# Patient Record
Sex: MALE | Race: WHITE | Employment: FULL TIME | URBAN - METROPOLITAN AREA
[De-identification: names, ages, dates, MRNs, and addresses within clinical notes are randomized per-mention and may not be internally consistent; named-entity substitution may affect disease eponyms.]

---

## 2017-02-20 ENCOUNTER — OFFICE VISIT (OUTPATIENT)
Dept: INTERNAL MEDICINE CLINIC | Age: 62
End: 2017-02-20

## 2017-02-20 VITALS
OXYGEN SATURATION: 96 % | RESPIRATION RATE: 16 BRPM | BODY MASS INDEX: 37.11 KG/M2 | SYSTOLIC BLOOD PRESSURE: 150 MMHG | WEIGHT: 280 LBS | TEMPERATURE: 97.8 F | HEART RATE: 97 BPM | HEIGHT: 73 IN | DIASTOLIC BLOOD PRESSURE: 88 MMHG

## 2017-02-20 DIAGNOSIS — Z23 NEED FOR TDAP VACCINATION: ICD-10-CM

## 2017-02-20 DIAGNOSIS — Z11.59 NEED FOR HEPATITIS C SCREENING TEST: ICD-10-CM

## 2017-02-20 DIAGNOSIS — Z00.00 ROUTINE GENERAL MEDICAL EXAMINATION AT A HEALTH CARE FACILITY: Primary | ICD-10-CM

## 2017-02-20 DIAGNOSIS — Z12.11 COLON CANCER SCREENING: ICD-10-CM

## 2017-02-20 DIAGNOSIS — L98.9 SKIN LESION OF LEFT ARM: ICD-10-CM

## 2017-02-20 DIAGNOSIS — Z23 NEED FOR SHINGLES VACCINE: ICD-10-CM

## 2017-02-20 LAB
HEMOCCULT STL QL: POSITIVE
VALID INTERNAL CONTROL?: YES

## 2017-02-20 NOTE — ACP (ADVANCE CARE PLANNING)
End of life planning discussed with patient. Patient states that they do have an advance directive and will provide a copy for our office at next visit. Patient has a document and will provide to us. It is up to date.

## 2017-02-20 NOTE — MR AVS SNAPSHOT
Visit Information Date & Time Provider Department Dept. Phone Encounter #  
 2/20/2017  4:00 PM Kacy Douglas MD [address] [address] Internal Medicine [phone] [id number] Upcoming Health Maintenance Date Due Hepatitis C Screening 1955 COLONOSCOPY 9/18/1972 DTaP/Tdap/Td series (1 - Tdap) 9/18/1975 ZOSTER VACCINE AGE 60> 9/18/2014 Allergies as of 2/20/2017  Review Complete On: 2/20/2017 By: Yen Calvillo LPN No Known Allergies Current Immunizations  Never Reviewed Name Date Tdap  Incomplete Not reviewed this visit You Were Diagnosed With   
  
 Codes Comments Routine general medical examination at a health care facility    -  Primary ICD-10-CM: Z00.00 ICD-9-CM: V70.0 Need for hepatitis C screening test     ICD-10-CM: Z11.59 
ICD-9-CM: V73.89 Need for shingles vaccine     ICD-10-CM: Y42 ICD-9-CM: V04.89 Need for Tdap vaccination     ICD-10-CM: Q46 ICD-9-CM: V06.1 Colon cancer screening     ICD-10-CM: Z12.11 ICD-9-CM: V76.51 Skin lesion of left arm     ICD-10-CM: L98.9 ICD-9-CM: 709.9 Vitals BP Pulse Temp Resp Height(growth percentile) Weight(growth percentile) 150/88 97 97.8 °F (36.6 °C) (Oral) 16 6' 0.5\" (1.842 m) 280 lb (127 kg) SpO2 BMI Smoking Status 96% 37.45 kg/m2 Current Some Day Smoker Vitals History BMI and BSA Data Body Mass Index Body Surface Area  
 37.45 kg/m 2 2.55 m 2 Preferred Pharmacy Pharmacy Name Phone [id number] Lin Bowser, [address] [place] [address] [phone] Your Updated Medication List  
  
   
This list is accurate as of: 2/20/17  4:58 PM.  Always use your most recent med list.  
  
  
  
  
 varicella zoster vacine live 19,400 unit/0.65 mL Susr injection Commonly known as:  ZOSTAVAX  
1 Vial by SubCUTAneous route once for 1 dose.  Indications: PREVENTION OF HERPES ZOSTER  
  
  
  
  
 Prescriptions Printed Refills  
 varicella zoster vacine live (ZOSTAVAX) 19,400 unit/0.65 mL susr injection 0 Si Vial by SubCUTAneous route once for 1 dose. Indications: PREVENTION OF HERPES ZOSTER Class: Print Route: SubCUTAneous We Performed the Following CBC WITH AUTOMATED DIFF [28166 CPT(R)] HEPATITIS C AB [79565 CPT(R)] LIPID PANEL [88530 CPT(R)] METABOLIC PANEL, COMPREHENSIVE [12592 CPT(R)] WA IMMUNIZ ADMIN,1 SINGLE/COMB VAC/TOXOID [id number] CPT(R)] PROSTATE SPECIFIC AG (PSA) [id number] CPT(R)] REFERRAL TO DERMATOLOGY [REF19 Custom] Comments:  
 Please evaluate patient for left arm lesion. REFERRAL TO GASTROENTEROLOGY [[id number] Custom] TETANUS, DIPHTHERIA TOXOIDS AND ACELLULAR PERTUSSIS VACCINE (TDAP), IN INDIVIDS. >=7, IM [id number] CPT(R)] TSH RFX ON ABNORMAL TO FREE T4 [[id number] Custom] UA/M W/RFLX CULTURE, ROUTINE [[id number] Custom] Referral Information Referral ID Referred By Referred To  
  
 [id number] [address], [address] Visits Status Start Date End Date 1 New Request 17 2 If your referral has a status of pending review or denied, additional information will be sent to support the outcome of this decision. Referral ID Referred By Referred To  
 [id number] [address], [place]  
   [address] [facility] [place], [address] Phone: [phone] Fax: [phone] Visits Status Start Date End Date 1 New Request 17 2 If your referral has a status of pending review or denied, additional information will be sent to support the outcome of this decision. Patient Instructions Shoulder Stretches: Exercises Your Care Instructions Here are some examples of exercises for your shoulder.  Start each exercise slowly. Ease off the exercise if you start to have pain. Your doctor or physical therapist will tell you when you can start these exercises and which ones will work best for you. How to do the exercises Note: These exercises should cause you to feel a gentle stretch, but no pain. Shoulder stretch 1.  a doorway and place one arm against the door frame. Your elbow should be a little higher than your shoulder. 2. Relax your shoulders as you lean forward, allowing your chest and shoulder muscles to stretch. You can also turn your body slightly away from your arm to stretch the muscles even more. 3. Hold for 15 to 30 seconds. 4. Repeat 2 to 4 times with each arm. Shoulder and chest stretch Shoulder and chest stretch 1. While sitting, relax your upper body so you slump slightly in your chair. 2. As you breathe in, straighten your back and open your arms out to the sides. 3. Gently pull your shoulder blades back and downward. 4. Hold for 15 to 30 seconds as your breathe normally. 5. Repeat 2 to 4 times. Overhead stretch 1. Reach up over your head with both arms. 2. Hold for 15 to 30 seconds. 3. Repeat 2 to 4 times. Follow-up care is a key part of your treatment and safety. Be sure to make and go to all appointments, and call your doctor if you are having problems. It's also a good idea to know your test results and keep a list of the medicines you take. Where can you learn more? Go to http://goran-verna.info/. Enter S254 in the search box to learn more about \"Shoulder Stretches: Exercises. \" Current as of: May 23, 2016 Content Version: 11.1 © 8287-4281 Healthwise, Incorporated. Care instructions adapted under license by [employer] (which disclaims liability or warranty for this information).  If you have questions about a medical condition or this instruction, always ask your healthcare professional. Fannie Aldana, [employer]orporated disclaims any warranty or liability for your use of this information. Introducing [facility] & HEALTH SERVICES! Fatoumata White introduces Litographs patient portal. Now you can access parts of your medical record, email your doctor's office, and request medication refills online. 1. In your internet browser, go to [email or web address] 2. Click on the First Time User? Click Here link in the Sign In box. You will see the New Member Sign Up page. 3. Enter your Litographs Access Code exactly as it appears below. You will not need to use this code after youve completed the sign-up process. If you do not sign up before the expiration date, you must request a new code. · Litographs Access Code: [email or web address] Expires: 5/21/2017  4:58 PM 
 
4. Enter the last four digits of your Social Security Number (xxxx) and Date of Birth (mm/dd/yyyy) as indicated and click Submit. You will be taken to the next sign-up page. 5. Create a Litographs ID. This will be your Litographs login ID and cannot be changed, so think of one that is secure and easy to remember. 6. Create a Litographs password. You can change your password at any time. 7. Enter your Password Reset Question and Answer. This can be used at a later time if you forget your password. 8. Enter your e-mail address. You will receive e-mail notification when new information is available in [address] 9. Click Sign Up. You can now view and download portions of your medical record. 10. Click the Download Summary menu link to download a portable copy of your medical information. If you have questions, please visit the Frequently Asked Questions section of the Litographs website. Remember, Litographs is NOT to be used for urgent needs. For medical emergencies, dial 911. Now available from your iPhone and Android! Please provide this summary of care documentation to your next provider. Your primary care clinician is listed as [place] [address]64 If you have any questions after today's visit, please call [phone].

## 2017-02-20 NOTE — PATIENT INSTRUCTIONS
Shoulder Stretches: Exercises  Your Care Instructions  Here are some examples of exercises for your shoulder. Start each exercise slowly. Ease off the exercise if you start to have pain. Your doctor or physical therapist will tell you when you can start these exercises and which ones will work best for you. How to do the exercises  Note: These exercises should cause you to feel a gentle stretch, but no pain. Shoulder stretch    1.  a doorway and place one arm against the door frame. Your elbow should be a little higher than your shoulder. 2. Relax your shoulders as you lean forward, allowing your chest and shoulder muscles to stretch. You can also turn your body slightly away from your arm to stretch the muscles even more. 3. Hold for 15 to 30 seconds. 4. Repeat 2 to 4 times with each arm. Shoulder and chest stretch    Shoulder and chest stretch  1. While sitting, relax your upper body so you slump slightly in your chair. 2. As you breathe in, straighten your back and open your arms out to the sides. 3. Gently pull your shoulder blades back and downward. 4. Hold for 15 to 30 seconds as your breathe normally. 5. Repeat 2 to 4 times. Overhead stretch    1. Reach up over your head with both arms. 2. Hold for 15 to 30 seconds. 3. Repeat 2 to 4 times. Follow-up care is a key part of your treatment and safety. Be sure to make and go to all appointments, and call your doctor if you are having problems. It's also a good idea to know your test results and keep a list of the medicines you take. Where can you learn more? Go to http://goran-verna.info/. Enter S254 in the search box to learn more about \"Shoulder Stretches: Exercises. \"  Current as of: May 23, 2016  Content Version: 11.1  © 1059-3126 Youca.st, [employer]ell[employer]. Care instructions adapted under license by [employer] (which disclaims liability or warranty for this information).  If you have questions about a medical condition or this instruction, always ask your healthcare professional. [place] [address] any warranty or liability for your use of this information.

## 2017-02-21 NOTE — PROGRESS NOTES
Subjective: Kavitha Lester is a 57 y.o. male presenting for annual exam and complete physical.    There are no active problems to display for this patient. Current Outpatient Prescriptions   Medication Sig Dispense Refill    varicella zoster chance live (ZOSTAVAX) 19,400 unit/0.65 mL susr injection 1 Vial by SubCUTAneous route once for 1 dose. Indications: PREVENTION OF HERPES ZOSTER 0.65 mL 0     No Known Allergies  History reviewed. No pertinent past medical history. Past Surgical History   Procedure Laterality Date    Hx tonsillectomy       Family History   Problem Relation Age of Onset    Diabetes Mother     Prostate Cancer Father 67    Thyroid Disease Daughter      Social History   Substance Use Topics    Smoking status: Current Some Day Smoker     Types: Cigars    Smokeless tobacco: Never Used      Comment: cigar    Alcohol use 0.6 oz/week     1 Shots of liquor per week             Review of Systems  A comprehensive review of systems was negative except for: Constitutional: positive for weight up and not doing any exercise. Genitourinary: positive for some nocturia off and on.    Integument/breast: positive for skin lesion(s)    Objective:     Visit Vitals    /88    Pulse 97    Temp 97.8 °F (36.6 °C) (Oral)    Resp 16    Ht 6' 0.5\" (1.842 m)    Wt 280 lb (127 kg)    SpO2 96%    BMI 37.45 kg/m2     Physical exam:   General appearance - alert, well appearing, and in no distress  Eyes - pupils equal and reactive, extraocular eye movements intact  Ears - bilateral TM's and external ear canals normal  Nose - normal and patent, no erythema, discharge or polyps  Mouth - mucous membranes moist, pharynx normal without lesions  Neck - supple, no significant adenopathy  Lymphatics - no palpable lymphadenopathy, no hepatosplenomegaly  Chest - clear to auscultation, no wheezes, rales or rhonchi, symmetric air entry  Heart - normal rate, regular rhythm, normal S1, S2, no murmurs, rubs, clicks or gallops  Abdomen - soft, nontender, nondistended, no masses or organomegaly  Rectal - negative without mass, lesions or tenderness, stool guaiac positive, PROSTATE EXAM: smooth and symmetric without nodules or tenderness  Back exam - full range of motion, no tenderness, palpable spasm or pain on motion  Neurological - alert, oriented, normal speech, no focal findings or movement disorder noted  Musculoskeletal - no joint tenderness, deformity or swelling  Extremities - peripheral pulses normal, no pedal edema, no clubbing or cyanosis  Skin - dry scaly red lesion on the left forearm. Multiple seb K's on the back. Assessment/Plan:       lose weight, increase physical activity, bring BP log to office visit, follow low fat diet, follow low salt diet, routine labs ordered. Valdez Lomeli was seen today for establish care. Diagnoses and all orders for this visit:    Routine general medical examination at a health care facility  -     CBC WITH AUTOMATED DIFF  -     METABOLIC PANEL, COMPREHENSIVE  -     PROSTATE SPECIFIC AG  -     LIPID PANEL  -     HEPATITIS C AB  -     TSH RFX ON ABNORMAL TO FREE T4  -     UA/M W/RFLX CULTURE, ROUTINE  -     REFERRAL TO GASTROENTEROLOGY  -     AMB POC FECAL BLOOD, OCCULT, QL 1 CARD    Need for hepatitis C screening test    Need for shingles vaccine  -     varicella zoster vacine live (ZOSTAVAX) 19,400 unit/0.65 mL susr injection; 1 Vial by SubCUTAneous route once for 1 dose. Indications: PREVENTION OF HERPES ZOSTER    Need for Tdap vaccination  -     TETANUS, DIPHTHERIA TOXOIDS AND ACELLULAR PERTUSSIS VACCINE (TDAP), IN INDIVIDS. >=7, IM  -     DE IMMUNIZ ADMIN,1 SINGLE/COMB VAC/TOXOID    Colon cancer screening    Skin lesion of left arm - likely actinic keratosis. -     REFERRAL TO DERMATOLOGY  Borderlines BP - will monitor at home and let me know if not well controlled to 140/90 or less. Follow-up Disposition: 12 months and as needed.     Advised him to call back or return to office if symptoms worsen/change/persist.  Discussed expected course/resolution/complications of diagnosis in detail with patient. Medication risks/benefits/costs/interactions/alternatives discussed with patient. He was given an after visit summary which includes diagnoses, current medications, & vitals. He expressed understanding with the diagnosis and plan. Tung Mooney

## 2017-03-04 LAB
ALBUMIN SERPL-MCNC: 3.9 G/DL (ref 3.6–4.8)
ALBUMIN/GLOB SERPL: 1.4 {RATIO} (ref 1.1–2.5)
ALP SERPL-CCNC: 68 IU/L (ref 39–117)
ALT SERPL-CCNC: 16 IU/L (ref 0–44)
APPEARANCE UR: CLEAR
AST SERPL-CCNC: 17 IU/L (ref 0–40)
BACTERIA #/AREA URNS HPF: NORMAL /[HPF]
BASOPHILS # BLD AUTO: 0 X10E3/UL (ref 0–0.2)
BASOPHILS NFR BLD AUTO: 0 %
BILIRUB SERPL-MCNC: 0.6 MG/DL (ref 0–1.2)
BILIRUB UR QL STRIP: NEGATIVE
BUN SERPL-MCNC: 13 MG/DL (ref 8–27)
BUN/CREAT SERPL: 15 (ref 10–22)
CALCIUM SERPL-MCNC: 8.7 MG/DL (ref 8.6–10.2)
CASTS URNS QL MICRO: NORMAL /LPF
CHLORIDE SERPL-SCNC: 102 MMOL/L (ref 96–106)
CHOLEST SERPL-MCNC: 184 MG/DL (ref 100–199)
CO2 SERPL-SCNC: 25 MMOL/L (ref 18–29)
COLOR UR: YELLOW
CREAT SERPL-MCNC: 0.84 MG/DL (ref 0.76–1.27)
EOSINOPHIL # BLD AUTO: 0.1 X10E3/UL (ref 0–0.4)
EOSINOPHIL NFR BLD AUTO: 1 %
EPI CELLS #/AREA URNS HPF: NORMAL /HPF
ERYTHROCYTE [DISTWIDTH] IN BLOOD BY AUTOMATED COUNT: 13.6 % (ref 12.3–15.4)
GLOBULIN SER CALC-MCNC: 2.8 G/DL (ref 1.5–4.5)
GLUCOSE SERPL-MCNC: 82 MG/DL (ref 65–99)
GLUCOSE UR QL: NEGATIVE
HCT VFR BLD AUTO: 45.3 % (ref 37.5–51)
HCV AB S/CO SERPL IA: <0.1 S/CO RATIO (ref 0–0.9)
HDLC SERPL-MCNC: 29 MG/DL
HGB BLD-MCNC: 14.9 G/DL (ref 12.6–17.7)
HGB UR QL STRIP: NEGATIVE
IMM GRANULOCYTES # BLD: 0 X10E3/UL (ref 0–0.1)
IMM GRANULOCYTES NFR BLD: 0 %
KETONES UR QL STRIP: NEGATIVE
LDLC SERPL CALC-MCNC: 133 MG/DL (ref 0–99)
LEUKOCYTE ESTERASE UR QL STRIP: NEGATIVE
LYMPHOCYTES # BLD AUTO: 2 X10E3/UL (ref 0.7–3.1)
LYMPHOCYTES NFR BLD AUTO: 28 %
MCH RBC QN AUTO: 30.6 PG (ref 26.6–33)
MCHC RBC AUTO-ENTMCNC: 32.9 G/DL (ref 31.5–35.7)
MCV RBC AUTO: 93 FL (ref 79–97)
MICRO URNS: ABNORMAL
MICRO URNS: ABNORMAL
MONOCYTES # BLD AUTO: 0.6 X10E3/UL (ref 0.1–0.9)
MONOCYTES NFR BLD AUTO: 8 %
MUCOUS THREADS URNS QL MICRO: PRESENT
NEUTROPHILS # BLD AUTO: 4.4 X10E3/UL (ref 1.4–7)
NEUTROPHILS NFR BLD AUTO: 63 %
NITRITE UR QL STRIP: NEGATIVE
PH UR STRIP: 6 [PH] (ref 5–7.5)
PLATELET # BLD AUTO: 196 X10E3/UL (ref 150–379)
POTASSIUM SERPL-SCNC: 4.6 MMOL/L (ref 3.5–5.2)
PROT SERPL-MCNC: 6.7 G/DL (ref 6–8.5)
PROT UR QL STRIP: NEGATIVE
PSA SERPL-MCNC: 0.4 NG/ML (ref 0–4)
RBC # BLD AUTO: 4.87 X10E6/UL (ref 4.14–5.8)
RBC #/AREA URNS HPF: NORMAL /HPF
SODIUM SERPL-SCNC: 141 MMOL/L (ref 134–144)
SP GR UR: >=1.03 (ref 1–1.03)
TRIGL SERPL-MCNC: 112 MG/DL (ref 0–149)
TSH SERPL DL<=0.005 MIU/L-ACNC: 1.1 UIU/ML (ref 0.45–4.5)
URINALYSIS REFLEX, 377202: ABNORMAL
UROBILINOGEN UR STRIP-MCNC: 0.2 MG/DL (ref 0.2–1)
VLDLC SERPL CALC-MCNC: 22 MG/DL (ref 5–40)
WBC # BLD AUTO: 7 X10E3/UL (ref 3.4–10.8)
WBC #/AREA URNS HPF: NORMAL /HPF

## 2018-05-16 ENCOUNTER — OFFICE VISIT (OUTPATIENT)
Dept: INTERNAL MEDICINE CLINIC | Age: 63
End: 2018-05-16

## 2018-05-16 VITALS
WEIGHT: 216 LBS | OXYGEN SATURATION: 98 % | TEMPERATURE: 98.2 F | SYSTOLIC BLOOD PRESSURE: 118 MMHG | RESPIRATION RATE: 12 BRPM | HEART RATE: 74 BPM | HEIGHT: 72 IN | DIASTOLIC BLOOD PRESSURE: 78 MMHG | BODY MASS INDEX: 29.26 KG/M2

## 2018-05-16 DIAGNOSIS — Z00.00 ROUTINE GENERAL MEDICAL EXAMINATION AT A HEALTH CARE FACILITY: Primary | ICD-10-CM

## 2018-05-16 DIAGNOSIS — Z23 NEED FOR SHINGLES VACCINE: ICD-10-CM

## 2018-05-16 NOTE — MR AVS SNAPSHOT
[address] [address] 
[phone] Patient: Shadi Gutierrez MRN:  SO Visit Information Date & Time Provider Department Dept. Phone Encounter #  
 2018  4:00 PM Flora Vu MD [facility] Internal Medicine [phone] [id number] Upcoming Health Maintenance Date Due ZOSTER VACCINE AGE 60> 2014 Pneumococcal 19-64 Medium Risk (1 of 1 - PPSV23) 2020* Influenza Age 5 to Adult 2018 COLONOSCOPY 2020 DTaP/Tdap/Td series (2 - Td) 2027 *Topic was postponed. The date shown is not the original due date. Allergies as of 2018  Review Complete On: 2018 By: Flora Vu MD  
 No Known Allergies Current Immunizations  Reviewed on 2018 Name Date Tdap 2017 Reviewed by Flora Vu MD on 2018 at  4:18 PM  
You Were Diagnosed With   
  
 Codes Comments Routine general medical examination at a health care facility    -  Primary ICD-10-CM: Z00.00 ICD-9-CM: V70.0 Need for shingles vaccine     ICD-10-CM: J12 ICD-9-CM: V04.89 Vitals BP Pulse Temp Resp Height(growth percentile) Weight(growth percentile) 118/78 74 98.2 °F (36.8 °C) (Oral) 12 6' (1.829 m) 216 lb (98 kg) SpO2 BMI Smoking Status 98% 29.29 kg/m2 Current Some Day Smoker Vitals History BMI and BSA Data Body Mass Index Body Surface Area  
 29.29 kg/m 2 2.23 m 2 Preferred Pharmacy Pharmacy Name Phone [id number] Matthias Fong, [address] [facility] [phone] Your Updated Medication List  
  
   
This list is accurate as of 18  4:33 PM.  Always use your most recent med list.  
  
  
  
  
 varicella-zoster recombinant (PF) 50 mcg/0.5 mL Susr injection Commonly known as:  SHINGRIX (PF)  
 0.5 mL by IntraMUSCular route once for 1 dose. Prescriptions Printed Refills  
 varicella-zoster recombinant, PF, (SHINGRIX, PF,) 50 mcg/0.5 mL susr injection 1 Si.5 mL by IntraMUSCular route once for 1 dose. Class: Print Route: IntraMUSCular We Performed the Following CBC WITH AUTOMATED DIFF [26967 CPT(R)] LIPID PANEL [94031 CPT(R)] METABOLIC PANEL, COMPREHENSIVE [43814 CPT(R)] PSA, DIAGNOSTIC (PROSTATE SPECIFIC AG) [id number] CPT(R)] TSH RFX ON ABNORMAL TO FREE T4 [[id number] Custom] UA/M W/RFLX CULTURE, ROUTINE [[id number] Custom] Introducing [facility] & HEALTH SERVICES! [facility] introduces Cheyipai patient portal. Now you can access parts of your medical record, email your doctor's office, and request medication refills online. 1. In your internet browser, go to [email or web address] 2. Click on the First Time User? Click Here link in the Sign In box. You will see the New Member Sign Up page. 3. Enter your Cheyipai Access Code exactly as it appears below. You will not need to use this code after youve completed the sign-up process. If you do not sign up before the expiration date, you must request a new code. · Cheyipai Access Code: [email or web address] Expires: 2018  3:55 PM 
 
4. Enter the last four digits of your Social Security Number (xxxx) and Date of Birth (mm/dd/yyyy) as indicated and click Submit. You will be taken to the next sign-up page. 5. Create a Cheyipai ID. This will be your Cheyipai login ID and cannot be changed, so think of one that is secure and easy to remember. 6. Create a Cheyipai password. You can change your password at any time. 7. Enter your Password Reset Question and Answer. This can be used at a later time if you forget your password. 8. Enter your e-mail address. You will receive e-mail notification when new information is available in [address] 9. Click Sign Up. You can now view and download portions of your medical record. 10. Click the Download Summary menu link to download a portable copy of your medical information. If you have questions, please visit the Frequently Asked Questions section of the Learnmetrics website. Remember, Learnmetrics is NOT to be used for urgent needs. For medical emergencies, dial 911. Now available from your iPhone and Android! Please provide this summary of care documentation to your next provider. Your primary care clinician is listed as Daniel Oswald64 If you have any questions after today's visit, please call [phone].

## 2018-05-16 NOTE — PROGRESS NOTES
Chief Complaint   Patient presents with    Complete Physical     Health Maintenance Due   Topic    ZOSTER VACCINE AGE 60>      · Shingrix order placed.

## 2018-05-17 LAB
ALBUMIN SERPL-MCNC: 4.1 G/DL (ref 3.6–4.8)
ALBUMIN/GLOB SERPL: 1.6 {RATIO} (ref 1.2–2.2)
ALP SERPL-CCNC: 63 IU/L (ref 39–117)
ALT SERPL-CCNC: 14 IU/L (ref 0–44)
APPEARANCE UR: CLEAR
AST SERPL-CCNC: 16 IU/L (ref 0–40)
BACTERIA #/AREA URNS HPF: NORMAL /[HPF]
BASOPHILS # BLD AUTO: 0 X10E3/UL (ref 0–0.2)
BASOPHILS NFR BLD AUTO: 0 %
BILIRUB SERPL-MCNC: 0.7 MG/DL (ref 0–1.2)
BILIRUB UR QL STRIP: NEGATIVE
BUN SERPL-MCNC: 21 MG/DL (ref 8–27)
BUN/CREAT SERPL: 26 (ref 10–24)
CALCIUM SERPL-MCNC: 8.9 MG/DL (ref 8.6–10.2)
CASTS URNS QL MICRO: NORMAL /LPF
CHLORIDE SERPL-SCNC: 100 MMOL/L (ref 96–106)
CHOLEST SERPL-MCNC: 180 MG/DL (ref 100–199)
CO2 SERPL-SCNC: 26 MMOL/L (ref 18–29)
COLOR UR: YELLOW
CREAT SERPL-MCNC: 0.8 MG/DL (ref 0.76–1.27)
EOSINOPHIL # BLD AUTO: 0.1 X10E3/UL (ref 0–0.4)
EOSINOPHIL NFR BLD AUTO: 2 %
EPI CELLS #/AREA URNS HPF: NORMAL /HPF
ERYTHROCYTE [DISTWIDTH] IN BLOOD BY AUTOMATED COUNT: 13.5 % (ref 12.3–15.4)
GFR SERPLBLD CREATININE-BSD FMLA CKD-EPI: 110 ML/MIN/1.73
GFR SERPLBLD CREATININE-BSD FMLA CKD-EPI: 95 ML/MIN/1.73
GLOBULIN SER CALC-MCNC: 2.6 G/DL (ref 1.5–4.5)
GLUCOSE SERPL-MCNC: 86 MG/DL (ref 65–99)
GLUCOSE UR QL: NEGATIVE
HCT VFR BLD AUTO: 44.9 % (ref 37.5–51)
HDLC SERPL-MCNC: 38 MG/DL
HGB BLD-MCNC: 14.8 G/DL (ref 13–17.7)
HGB UR QL STRIP: NEGATIVE
IMM GRANULOCYTES # BLD: 0 X10E3/UL (ref 0–0.1)
IMM GRANULOCYTES NFR BLD: 0 %
KETONES UR QL STRIP: NEGATIVE
LDLC SERPL CALC-MCNC: 126 MG/DL (ref 0–99)
LEUKOCYTE ESTERASE UR QL STRIP: NEGATIVE
LYMPHOCYTES # BLD AUTO: 2.4 X10E3/UL (ref 0.7–3.1)
LYMPHOCYTES NFR BLD AUTO: 31 %
MCH RBC QN AUTO: 30.5 PG (ref 26.6–33)
MCHC RBC AUTO-ENTMCNC: 33 G/DL (ref 31.5–35.7)
MCV RBC AUTO: 92 FL (ref 79–97)
MICRO URNS: NORMAL
MICRO URNS: NORMAL
MONOCYTES # BLD AUTO: 0.7 X10E3/UL (ref 0.1–0.9)
MONOCYTES NFR BLD AUTO: 9 %
MUCOUS THREADS URNS QL MICRO: PRESENT
NEUTROPHILS # BLD AUTO: 4.4 X10E3/UL (ref 1.4–7)
NEUTROPHILS NFR BLD AUTO: 58 %
NITRITE UR QL STRIP: NEGATIVE
PH UR STRIP: 6 [PH] (ref 5–7.5)
PLATELET # BLD AUTO: 192 X10E3/UL (ref 150–379)
POTASSIUM SERPL-SCNC: 4.5 MMOL/L (ref 3.5–5.2)
PROT SERPL-MCNC: 6.7 G/DL (ref 6–8.5)
PROT UR QL STRIP: NEGATIVE
PSA SERPL-MCNC: 0.4 NG/ML (ref 0–4)
RBC # BLD AUTO: 4.86 X10E6/UL (ref 4.14–5.8)
RBC #/AREA URNS HPF: NORMAL /HPF
SODIUM SERPL-SCNC: 141 MMOL/L (ref 134–144)
SP GR UR: 1.03 (ref 1–1.03)
TRIGL SERPL-MCNC: 78 MG/DL (ref 0–149)
TSH SERPL DL<=0.005 MIU/L-ACNC: 1.47 UIU/ML (ref 0.45–4.5)
URINALYSIS REFLEX, 377202: NORMAL
UROBILINOGEN UR STRIP-MCNC: 0.2 MG/DL (ref 0.2–1)
VLDLC SERPL CALC-MCNC: 16 MG/DL (ref 5–40)
WBC # BLD AUTO: 7.6 X10E3/UL (ref 3.4–10.8)
WBC #/AREA URNS HPF: NORMAL /HPF

## 2018-05-17 NOTE — PROGRESS NOTES
Subjective: Lakshmi Hernandez is a 60 y.o. male presenting for annual exam and complete physical.    There are no active problems to display for this patient. Current Outpatient Prescriptions   Medication Sig Dispense Refill    varicella-zoster recombinant, PF, (SHINGRIX, PF,) 50 mcg/0.5 mL susr injection 0.5 mL by IntraMUSCular route once for 1 dose. 0.5 mL 1     No Known Allergies  History reviewed. No pertinent past medical history. Past Surgical History:   Procedure Laterality Date    HX COLONOSCOPY  04/17/2017    Dr. Kierra Gonzalez TONSILLECTOMY       Family History   Problem Relation Age of Onset    Diabetes Mother     Prostate Cancer Father 67    Thyroid Disease Daughter      Social History   Substance Use Topics    Smoking status: Current Some Day Smoker     Types: Cigars    Smokeless tobacco: Never Used      Comment: cigar    Alcohol use 0.6 oz/week     1 Shots of liquor per week        Lab Results   Component Value Date/Time    WBC 7.0 03/03/2017 11:55 AM    HGB 14.9 03/03/2017 11:55 AM    HCT 45.3 03/03/2017 11:55 AM    PLATELET 317 99/92/9459 11:55 AM    MCV 93 03/03/2017 11:55 AM     Lab Results   Component Value Date/Time    Cholesterol, total 184 03/03/2017 11:55 AM    HDL Cholesterol 29 (L) 03/03/2017 11:55 AM    LDL, calculated 133 (H) 03/03/2017 11:55 AM    Triglyceride 112 03/03/2017 11:55 AM     Lab Results   Component Value Date/Time    ALT (SGPT) 16 03/03/2017 11:55 AM    AST (SGOT) 17 03/03/2017 11:55 AM    Alk.  phosphatase 68 03/03/2017 11:55 AM    Bilirubin, total 0.6 03/03/2017 11:55 AM    Albumin 3.9 03/03/2017 11:55 AM    Protein, total 6.7 03/03/2017 11:55 AM    PLATELET 904 27/52/1627 11:55 AM       Lab Results   Component Value Date/Time    GFR est non-AA 94 03/03/2017 11:55 AM    GFR est  03/03/2017 11:55 AM    Creatinine 0.84 03/03/2017 11:55 AM    BUN 13 03/03/2017 11:55 AM    Sodium 141 03/03/2017 11:55 AM    Potassium 4.6 03/03/2017 11:55 AM    Chloride 102 03/03/2017 11:55 AM    CO2 25 03/03/2017 11:55 AM     Lab Results   Component Value Date/Time    Prostate Specific Ag 0.4 03/03/2017 11:55 AM     Lab Results   Component Value Date/Time    TSH 1.100 03/03/2017 11:55 AM      Lab Results   Component Value Date/Time    Glucose 82 03/03/2017 11:55 AM         Review of Systems  A comprehensive review of systems was negative except for: Has had a 65 pound weight loss intentionally. He is not currently exercising. He otherwise has been feeling very well. Review of systems is negative except per the above. Objective:     Visit Vitals    /78    Pulse 74    Temp 98.2 °F (36.8 °C) (Oral)    Resp 12    Ht 6' (1.829 m)    Wt 216 lb (98 kg)    SpO2 98%    BMI 29.29 kg/m2     Physical exam:   General appearance - alert, well appearing, and in no distress  Eyes - pupils equal and reactive, extraocular eye movements intact  Ears - bilateral TM's and external ear canals normal  Nose - normal and patent, no erythema, discharge or polyps  Mouth - mucous membranes moist, pharynx normal without lesions  Neck - supple, no significant adenopathy  Lymphatics - no palpable lymphadenopathy, no hepatosplenomegaly  Chest - clear to auscultation, no wheezes, rales or rhonchi, symmetric air entry  Heart - normal rate, regular rhythm, normal S1, S2, no murmurs, rubs, clicks or gallops  Abdomen - soft, nontender, nondistended, no masses or organomegaly  Rectal - deferred, not clinically indicated  Back exam - full range of motion, no tenderness, palpable spasm or pain on motion  Neurological - alert, oriented, normal speech, no focal findings or movement disorder noted  Musculoskeletal - no joint tenderness, deformity or swelling  Extremities - peripheral pulses normal, no pedal edema, no clubbing or cyanosis     Assessment/Plan:       lose weight, increase physical activity, follow low fat diet, routine labs ordered. Diagnoses and all orders for this visit:    1.  Routine general medical examination at a health care facility  -     METABOLIC PANEL, COMPREHENSIVE  -     CBC WITH AUTOMATED DIFF  -     LIPID PANEL  -     TSH RFX ON ABNORMAL TO FREE T4  -     UA/M W/RFLX CULTURE, ROUTINE  -     PROSTATE SPECIFIC AG    2. Need for shingles vaccine  -     varicella-zoster recombinant, PF, (SHINGRIX, PF,) 50 mcg/0.5 mL susr injection; 0.5 mL by IntraMUSCular route once for 1 dose. Follow-up Disposition: 12 months and as needed   Advised him to call back or return to office if symptoms worsen/change/persist.  Discussed expected course/resolution/complications of diagnosis in detail with patient. Medication risks/benefits/costs/interactions/alternatives discussed with patient. He was given an after visit summary which includes diagnoses, current medications, & vitals. He expressed understanding with the diagnosis and plan. Clinton Castro

## 2019-05-17 ENCOUNTER — OFFICE VISIT (OUTPATIENT)
Dept: INTERNAL MEDICINE CLINIC | Age: 64
End: 2019-05-17

## 2019-05-17 VITALS — HEIGHT: 72 IN | BODY MASS INDEX: 31.42 KG/M2 | RESPIRATION RATE: 16 BRPM | WEIGHT: 232 LBS

## 2019-05-17 DIAGNOSIS — Z00.00 ROUTINE GENERAL MEDICAL EXAMINATION AT A HEALTH CARE FACILITY: Primary | ICD-10-CM

## 2019-05-17 DIAGNOSIS — Z12.5 PROSTATE CANCER SCREENING: ICD-10-CM

## 2019-05-17 NOTE — PROGRESS NOTES
Subjective: Tiana Booker is a 58 y.o. male presenting for annual exam and complete physical. 
 
There are no active problems to display for this patient. Current Outpatient Medications Medication Sig Dispense Refill  pneumococcal 13 mary conj dip (PREVNAR-13) 0.5 mL syrg injection 0.5 mL by IntraMUSCular route once for 1 dose. 0.5 mL 0  
 varicella-zoster recombinant, PF, (SHINGRIX) 50 mcg/0.5 mL susr injection 0.5 mL by IntraMUSCular route once for 1 dose. 0.5 mL 1 No Known Allergies No past medical history on file. Past Surgical History:  
Procedure Laterality Date  HX COLONOSCOPY  04/17/2017 Dr. Blackman Linear  HX TONSILLECTOMY Family History Problem Relation Age of Onset  Diabetes Mother  Thyroid Disease Mother  Elevated Lipids Mother  Prostate Cancer Father 67  Thyroid Disease Daughter Social History Tobacco Use  Smoking status: Never Smoker  Smokeless tobacco: Never Used  Tobacco comment: cigar Substance Use Topics  Alcohol use: Yes Alcohol/week: 0.6 oz Types: 1 Shots of liquor per week Lab Results Component Value Date/Time WBC 7.6 05/16/2018 04:51 PM  
 HGB 14.8 05/16/2018 04:51 PM  
 HCT 44.9 05/16/2018 04:51 PM  
 PLATELET 958 65/92/8571 04:51 PM  
 MCV 92 05/16/2018 04:51 PM  
 
Lab Results Component Value Date/Time Cholesterol, total 180 05/16/2018 04:51 PM  
 HDL Cholesterol 38 (L) 05/16/2018 04:51 PM  
 LDL, calculated 126 (H) 05/16/2018 04:51 PM  
 Triglyceride 78 05/16/2018 04:51 PM  
 
Lab Results Component Value Date/Time ALT (SGPT) 14 05/16/2018 04:51 PM  
 AST (SGOT) 16 05/16/2018 04:51 PM  
 Alk. phosphatase 63 05/16/2018 04:51 PM  
 Bilirubin, total 0.7 05/16/2018 04:51 PM  
 Albumin 4.1 05/16/2018 04:51 PM  
 Protein, total 6.7 05/16/2018 04:51 PM  
 PLATELET 974 83/37/6182 04:51 PM  
 
Lab Results Component Value Date/Time  GFR est non-AA 95 05/16/2018 04:51 PM  
 GFR est  05/16/2018 04:51 PM  
 Creatinine 0.80 05/16/2018 04:51 PM  
 BUN 21 05/16/2018 04:51 PM  
 Sodium 141 05/16/2018 04:51 PM  
 Potassium 4.5 05/16/2018 04:51 PM  
 Chloride 100 05/16/2018 04:51 PM  
 CO2 26 05/16/2018 04:51 PM  
 
Lab Results Component Value Date/Time  
 Prostate Specific Ag 0.4 05/16/2018 04:51 PM  
 Prostate Specific Ag 0.4 03/03/2017 11:55 AM  
 
Lab Results Component Value Date/Time TSH 1.470 05/16/2018 04:51 PM  
  
Lab Results Component Value Date/Time Glucose 86 05/16/2018 04:51 PM  
   
 
Review of Systems A comprehensive review of systems was negative. Objective:  
 
Visit Vitals BP (P) 108/78 Pulse (P) 72 Temp (P) 97.9 °F (36.6 °C) (Oral) Resp 16 Ht 6' (1.829 m) Wt 232 lb (105.2 kg) SpO2 (P) 97% BMI 31.46 kg/m² Physical exam:  
General appearance - alert, well appearing, and in no distress Eyes - pupils equal and reactive, extraocular eye movements intact Ears - bilateral TM's and external ear canals normal 
Nose - normal and patent, no erythema, discharge or polyps Mouth - mucous membranes moist, pharynx normal without lesions Neck - supple, no significant adenopathy Lymphatics - no palpable lymphadenopathy, no hepatosplenomegaly Chest - clear to auscultation, no wheezes, rales or rhonchi, symmetric air entry Heart - normal rate, regular rhythm, normal S1, S2, no murmurs, rubs, clicks or gallops Abdomen - soft, nontender, nondistended, no masses or organomegaly Back exam - full range of motion, no tenderness, palpable spasm or pain on motion Neurological - alert, oriented, normal speech, no focal findings or movement disorder noted Musculoskeletal - no joint tenderness, deformity or swelling Extremities - peripheral pulses normal, no pedal edema, no clubbing or cyanosis Assessment/Plan:  
 
 
lose weight, increase physical activity, routine labs ordered.   
Diagnoses and all orders for this visit: 
 
 1. Routine general medical examination at a health care facility -     METABOLIC PANEL, COMPREHENSIVE 
-     CBC WITH AUTOMATED DIFF 
-     TSH RFX ON ABNORMAL TO FREE T4 
-     PSA TOTAL (REFLEX TO FREE) -     LIPID PANEL 2. Prostate cancer screening -     PSA TOTAL (REFLEX TO FREE) Other orders -     pneumococcal 13 mary conj dip (PREVNAR-13) 0.5 mL syrg injection; 0.5 mL by IntraMUSCular route once for 1 dose. 
-     varicella-zoster recombinant, PF, (SHINGRIX) 50 mcg/0.5 mL susr injection; 0.5 mL by IntraMUSCular route once for 1 dose. Advised him to call back or return to office if symptoms worsen/change/persist. 
Discussed expected course/resolution/complications of diagnosis in detail with patient. Medication risks/benefits/costs/interactions/alternatives discussed with patient. He was given an after visit summary which includes diagnoses, current medications, & vitals. He expressed understanding with the diagnosis and plan. Devante Joseph

## 2019-05-18 LAB
ALBUMIN SERPL-MCNC: 4.2 G/DL (ref 3.6–4.8)
ALBUMIN/GLOB SERPL: 1.7 {RATIO} (ref 1.2–2.2)
ALP SERPL-CCNC: 57 IU/L (ref 39–117)
ALT SERPL-CCNC: 21 IU/L (ref 0–44)
AST SERPL-CCNC: 21 IU/L (ref 0–40)
BASOPHILS # BLD AUTO: 0 X10E3/UL (ref 0–0.2)
BASOPHILS NFR BLD AUTO: 0 %
BILIRUB SERPL-MCNC: 0.7 MG/DL (ref 0–1.2)
BUN SERPL-MCNC: 21 MG/DL (ref 8–27)
BUN/CREAT SERPL: 28 (ref 10–24)
CALCIUM SERPL-MCNC: 9.1 MG/DL (ref 8.6–10.2)
CHLORIDE SERPL-SCNC: 102 MMOL/L (ref 96–106)
CHOLEST SERPL-MCNC: 176 MG/DL (ref 100–199)
CO2 SERPL-SCNC: 23 MMOL/L (ref 20–29)
CREAT SERPL-MCNC: 0.74 MG/DL (ref 0.76–1.27)
EOSINOPHIL # BLD AUTO: 0.1 X10E3/UL (ref 0–0.4)
EOSINOPHIL NFR BLD AUTO: 2 %
ERYTHROCYTE [DISTWIDTH] IN BLOOD BY AUTOMATED COUNT: 13.6 % (ref 12.3–15.4)
GLOBULIN SER CALC-MCNC: 2.5 G/DL (ref 1.5–4.5)
GLUCOSE SERPL-MCNC: 76 MG/DL (ref 65–99)
HCT VFR BLD AUTO: 44.6 % (ref 37.5–51)
HDLC SERPL-MCNC: 37 MG/DL
HGB BLD-MCNC: 14.5 G/DL (ref 13–17.7)
IMM GRANULOCYTES # BLD AUTO: 0 X10E3/UL (ref 0–0.1)
IMM GRANULOCYTES NFR BLD AUTO: 0 %
LDLC SERPL CALC-MCNC: 122 MG/DL (ref 0–99)
LYMPHOCYTES # BLD AUTO: 1.6 X10E3/UL (ref 0.7–3.1)
LYMPHOCYTES NFR BLD AUTO: 26 %
MCH RBC QN AUTO: 30.3 PG (ref 26.6–33)
MCHC RBC AUTO-ENTMCNC: 32.5 G/DL (ref 31.5–35.7)
MCV RBC AUTO: 93 FL (ref 79–97)
MONOCYTES # BLD AUTO: 0.5 X10E3/UL (ref 0.1–0.9)
MONOCYTES NFR BLD AUTO: 9 %
NEUTROPHILS # BLD AUTO: 3.8 X10E3/UL (ref 1.4–7)
NEUTROPHILS NFR BLD AUTO: 63 %
PLATELET # BLD AUTO: 210 X10E3/UL (ref 150–379)
POTASSIUM SERPL-SCNC: 4.4 MMOL/L (ref 3.5–5.2)
PROT SERPL-MCNC: 6.7 G/DL (ref 6–8.5)
PSA SERPL-MCNC: 0.4 NG/ML (ref 0–4)
RBC # BLD AUTO: 4.78 X10E6/UL (ref 4.14–5.8)
REFLEX CRITERIA: NORMAL
SODIUM SERPL-SCNC: 141 MMOL/L (ref 134–144)
TRIGL SERPL-MCNC: 86 MG/DL (ref 0–149)
TSH SERPL DL<=0.005 MIU/L-ACNC: 1.01 UIU/ML (ref 0.45–4.5)
VLDLC SERPL CALC-MCNC: 17 MG/DL (ref 5–40)
WBC # BLD AUTO: 6.1 X10E3/UL (ref 3.4–10.8)

## 2020-06-02 ENCOUNTER — ANESTHESIA (OUTPATIENT)
Dept: ENDOSCOPY | Age: 65
End: 2020-06-02
Payer: MEDICARE

## 2020-06-02 ENCOUNTER — ANESTHESIA EVENT (OUTPATIENT)
Dept: ENDOSCOPY | Age: 65
End: 2020-06-02
Payer: MEDICARE

## 2020-06-02 ENCOUNTER — HOSPITAL ENCOUNTER (OUTPATIENT)
Age: 65
Setting detail: OUTPATIENT SURGERY
Discharge: HOME | End: 2020-06-02
Attending: INTERNAL MEDICINE | Admitting: INTERNAL MEDICINE
Payer: MEDICARE

## 2020-06-02 VITALS
BODY MASS INDEX: 29.82 KG/M2 | HEART RATE: 80 BPM | WEIGHT: 225 LBS | SYSTOLIC BLOOD PRESSURE: 106 MMHG | DIASTOLIC BLOOD PRESSURE: 66 MMHG | RESPIRATION RATE: 14 BRPM | HEIGHT: 73 IN | TEMPERATURE: 98.2 F | OXYGEN SATURATION: 97 %

## 2020-06-02 PROCEDURE — 76060000031 HC ANESTHESIA FIRST 0.5 HR: Performed by: INTERNAL MEDICINE

## 2020-06-02 PROCEDURE — 88305 TISSUE EXAM BY PATHOLOGIST: CPT

## 2020-06-02 PROCEDURE — 74011000250 HC RX REV CODE- 250: Performed by: NURSE ANESTHETIST, CERTIFIED REGISTERED

## 2020-06-02 PROCEDURE — 76040000019: Performed by: INTERNAL MEDICINE

## 2020-06-02 PROCEDURE — 74011250636 HC RX REV CODE- 250/636: Performed by: NURSE ANESTHETIST, CERTIFIED REGISTERED

## 2020-06-02 PROCEDURE — 77030013992 HC SNR POLYP ENDOSC BSC -B: Performed by: INTERNAL MEDICINE

## 2020-06-02 RX ORDER — BISMUTH SUBSALICYLATE 262 MG
1 TABLET,CHEWABLE ORAL DAILY
COMMUNITY

## 2020-06-02 RX ORDER — SODIUM CHLORIDE 0.9 % (FLUSH) 0.9 %
5-40 SYRINGE (ML) INJECTION EVERY 8 HOURS
Status: DISCONTINUED | OUTPATIENT
Start: 2020-06-02 | End: 2020-06-02 | Stop reason: HOSPADM

## 2020-06-02 RX ORDER — SODIUM CHLORIDE 9 MG/ML
150 INJECTION, SOLUTION INTRAVENOUS CONTINUOUS
Status: DISCONTINUED | OUTPATIENT
Start: 2020-06-02 | End: 2020-06-02 | Stop reason: HOSPADM

## 2020-06-02 RX ORDER — ATROPINE SULFATE 0.1 MG/ML
0.5 INJECTION INTRAVENOUS
Status: DISCONTINUED | OUTPATIENT
Start: 2020-06-02 | End: 2020-06-02 | Stop reason: HOSPADM

## 2020-06-02 RX ORDER — EPINEPHRINE 0.1 MG/ML
1 INJECTION INTRACARDIAC; INTRAVENOUS
Status: DISCONTINUED | OUTPATIENT
Start: 2020-06-02 | End: 2020-06-02 | Stop reason: HOSPADM

## 2020-06-02 RX ORDER — FENTANYL CITRATE 50 UG/ML
200 INJECTION, SOLUTION INTRAMUSCULAR; INTRAVENOUS
Status: DISCONTINUED | OUTPATIENT
Start: 2020-06-02 | End: 2020-06-02 | Stop reason: HOSPADM

## 2020-06-02 RX ORDER — SODIUM CHLORIDE 9 MG/ML
INJECTION, SOLUTION INTRAVENOUS
Status: DISCONTINUED | OUTPATIENT
Start: 2020-06-02 | End: 2020-06-02 | Stop reason: HOSPADM

## 2020-06-02 RX ORDER — MIDAZOLAM HYDROCHLORIDE 1 MG/ML
.25-5 INJECTION, SOLUTION INTRAMUSCULAR; INTRAVENOUS
Status: DISCONTINUED | OUTPATIENT
Start: 2020-06-02 | End: 2020-06-02 | Stop reason: HOSPADM

## 2020-06-02 RX ORDER — PROPOFOL 10 MG/ML
INJECTION, EMULSION INTRAVENOUS AS NEEDED
Status: DISCONTINUED | OUTPATIENT
Start: 2020-06-02 | End: 2020-06-02 | Stop reason: HOSPADM

## 2020-06-02 RX ORDER — SODIUM CHLORIDE 0.9 % (FLUSH) 0.9 %
5-40 SYRINGE (ML) INJECTION AS NEEDED
Status: DISCONTINUED | OUTPATIENT
Start: 2020-06-02 | End: 2020-06-02 | Stop reason: HOSPADM

## 2020-06-02 RX ORDER — DEXTROMETHORPHAN/PSEUDOEPHED 2.5-7.5/.8
1.2 DROPS ORAL
Status: DISCONTINUED | OUTPATIENT
Start: 2020-06-02 | End: 2020-06-02 | Stop reason: HOSPADM

## 2020-06-02 RX ORDER — LIDOCAINE HYDROCHLORIDE 20 MG/ML
INJECTION, SOLUTION EPIDURAL; INFILTRATION; INTRACAUDAL; PERINEURAL AS NEEDED
Status: DISCONTINUED | OUTPATIENT
Start: 2020-06-02 | End: 2020-06-02 | Stop reason: HOSPADM

## 2020-06-02 RX ADMIN — PROPOFOL 200 MG: 10 INJECTION, EMULSION INTRAVENOUS at 09:09

## 2020-06-02 RX ADMIN — LIDOCAINE HYDROCHLORIDE 80 MG: 20 INJECTION, SOLUTION EPIDURAL; INFILTRATION; INTRACAUDAL; PERINEURAL at 09:12

## 2020-06-02 RX ADMIN — PROPOFOL 50 MG: 10 INJECTION, EMULSION INTRAVENOUS at 09:12

## 2020-06-02 RX ADMIN — SODIUM CHLORIDE: 900 INJECTION, SOLUTION INTRAVENOUS at 08:36

## 2020-06-02 NOTE — ANESTHESIA POSTPROCEDURE EVALUATION
Procedure(s):  COLONOSCOPY  ENDOSCOPIC POLYPECTOMY. MAC    Anesthesia Post Evaluation      Multimodal analgesia: multimodal analgesia used between 6 hours prior to anesthesia start to PACU discharge  Patient location during evaluation: bedside  Patient participation: waiting for patient participation  Level of consciousness: awake  Pain management: adequate  Airway patency: patent  Anesthetic complications: no  Cardiovascular status: acceptable  Respiratory status: unassisted  Hydration status: acceptable  Comments: Post-Anesthesia Evaluation and Assessment    I have evaluated the patient and they are ready for PACU discharge. Patient: Sarath Bell MRN: [id number]  SSN: [id number]   YOB: 1955  Age: 71 y.o. Sex: male      Cardiovascular Function/Vital Signs  /66   Pulse 80   Temp 36.8 °C (98.2 °F)   Resp 14   Ht 6' 1\" (1.854 m)   Wt 102.1 kg (225 lb)   SpO2 97%   BMI 29.69 kg/m²     Patient is status post MAC anesthesia for Procedure(s):  COLONOSCOPY  ENDOSCOPIC POLYPECTOMY. Nausea/Vomiting: None    Postoperative hydration reviewed and adequate. Pain:  Pain Scale 1: Numeric (0 - 10) (06/02/20 0937)  Pain Intensity 1: 0 (06/02/20 4448)   Managed    Neurological Status: At baseline    Mental Status, Level of Consciousness: Alert and  oriented to person, place, and time    Pulmonary Status:   O2 Device: Room air (06/02/20 0937)   Adequate oxygenation and airway patent    Complications related to anesthesia: None    Post-anesthesia assessment completed. No concerns    Signed By: Junior Stern MD    June 2, 2020                   INITIAL Post-op Vital signs:   Vitals Value Taken Time   BP 0/0 6/2/2020  9:50 AM   Temp 36.8 °C (98.2 °F) 6/2/2020  9:32 AM   Pulse 0 6/2/2020  9:50 AM   Resp 0 6/2/2020  9:54 AM   SpO2 0 % 6/2/2020  9:51 AM   Vitals shown include unvalidated device data.

## 2020-06-02 NOTE — PROGRESS NOTES

## 2020-06-02 NOTE — H&P
[address] [place] [address], [address] [place] [address]          Pre-procedure History and Physical       NAME:  Norman Marshall :   1955   MRN:   [id number]     CHIEF COMPLAINT/HPI: See procedure note    PMH:  History reviewed. No pertinent past medical history. PSH:  Past Surgical History:   Procedure Laterality Date    HX COLONOSCOPY  2017    Dr. [facility]      2017 colonoscopy    HX TONSILLECTOMY      as a kid       Allergies:  No Known Allergies    Home Medications:  Prior to Admission Medications   Prescriptions Last Dose Informant Patient Reported? Taking?   multivitamin (ONE A DAY) tablet 2020 at Unknown time  Yes Yes   Sig: Take 1 Tab by mouth daily. Facility-Administered Medications: None       Hospital Medications:  Current Facility-Administered Medications   Medication Dose Route Frequency    0.9% sodium chloride infusion  150 mL/hr IntraVENous CONTINUOUS    sodium chloride (NS) flush 5-40 mL  5-40 mL IntraVENous Q8H    sodium chloride (NS) flush 5-40 mL  5-40 mL IntraVENous PRN    midazolam (VERSED) injection 0.25-5 mg  0.25-5 mg IntraVENous Multiple    fentaNYL citrate (PF) injection 200 mcg  200 mcg IntraVENous Multiple    simethicone (MYLICON) 77TL/1.0HH oral drops 80 mg  1.2 mL Oral Multiple    atropine injection 0.5 mg  0.5 mg IntraVENous ONCE PRN    EPINEPHrine (ADRENALIN) 0.1 mg/mL syringe 1 mg  1 mg Endoscopically ONCE PRN       Family History:  Family History   Problem Relation Age of Onset    Diabetes Mother     Thyroid Disease Mother     Elevated Lipids Mother     Prostate Cancer Father 67    Thyroid Disease Daughter        Social History:  Social History     Tobacco Use    Smoking status: Never Smoker    Smokeless tobacco: Never Used    Tobacco comment: cigar   Substance Use Topics    Alcohol use:  Yes     Alcohol/week: 1.0 standard drinks     Types: 1 Shots of liquor per week       The patient was counseled at length about the risks of camille Covid-19 in the wilmer-operative and post-operative states including the recovery window of their procedure. The patient was made aware that camille Covid-19 after a surgical procedure may worsen their prognosis for recovering from the virus and lend to a higher morbidity and or mortality risk. The patient was given the options of postponing their procedure. All of the risks, benefits, and alternatives were discussed. The patient does  wish to proceed with the procedure. PHYSICAL EXAM PRIOR TO SEDATION:  General: Alert, in no acute distress    Lungs:            CTA bilaterally  Heart:  Normal S1, S2    Abdomen: Soft, Non distended, Non tender. Normoactive bowel sounds. Assessment:   Stable for sedation administration.   Date of last colonoscopy: 3 yrs, Polyps  Yes    Plan:     · Endoscopic procedure with sedation     Signed By: Gerard Rodriguez MD     6/2/2020  9:12 AM

## 2020-06-02 NOTE — PROCEDURES
Radha Mantilla 912 Zoraida Palomares M.D.  [address] [place] [address]  [phone]               Colonoscopy Procedure Note    NAME: Ethan Blankenship :  1955  MRN:  [id number]    Indications:   Personal history of colon polyps (screening only)     : Adri Duong MD    Referring Provider:  Gamaliel Welsh MD    Surgical Assistant: none    Prosthetic devices, grafts, tissues, transplant, or devices implanted: none    Medicines:  MAC anesthesia      Procedure Details:  After informed consent was obtained with all risks and benefits of the procedure explained and preprocedure exam completed, the patient was placed in the left lateral decubitus position. Universal protocol for patient identification was performed and documented in the nursing notes. Throughout the procedure, the patient's blood pressure was monitored at least every five minutes; pulse, and oxygen saturations were monitored continuously. All vital signs were documented in the nursing notes. A digital rectal exam was performed and was normal.  The Olympus videocolonoscope  was inserted in the rectum and carefully advanced to the cecum, which was identified by the ileocecal valve and appendiceal orifice. The colonoscope was slowly withdrawn with careful evaluation between folds. Retroflexion in the rectum was performed; findings and interventions are described below. Procedure start time, extent reached time/cecum time, and procedure end time are documented in the nursing notes. The quality of preparation was good. Findings:   Rectum: Grade small internal hemorrhoid(s);   Sigmoid:     - Diverticulosis-mild  Descending Colon:     - Diverticulosis-moderate  Transverse Colon: diminutive polyp s/p cold snare polypectomy  Ascending Colon:     - Diverticulosis-mild  Cecum: normal    Interventions:    1 complete polypectomy were performed using cold snare  and the polyps were retrieved    Specimens:   ID Type Source Tests Collected by Time Destination   1 : Transverse Colon Polyp Preservative   Shelli Blankenship MD 6/2/2020 1974 Pathology       EBL:  None. Complications:   No immediate complications     Impression:  -See post-procedure diagnoses. Recommendations:   -Repeat colonoscopy in 5 years. If < 10 years, reason:  above average risk patient    Resume normal medication(s). Signed by:  Sabrina Lemos MD          6/2/2020  9:32 AM

## 2020-06-02 NOTE — DISCHARGE INSTRUCTIONS
Radha Mantilla 912 Radhika Barrett M.D.  [facility], [address] [place] [address]  [phone]          [facility] Ivette Owens  [id number]  1955    DISCOMFORT:  Redness at IV site- apply warm compress to area; if redness or soreness persist- contact your physician  There may be a slight amount of blood passed from the rectum  Gaseous discomfort- walking, belching will help relieve any discomfort  You may not operate a vehicle for 12 hours  You may not engage in an occupation involving machinery or appliances for the  rest of today  You may not drink alcoholic beverages for at least 12 hours  Avoid making any critical decisions for at least 24 hours    DIET:   You may resume your normal diet, but some patients find that heavy or large  meals may lead to indigestion or vomiting. I suggest a light meal as first food  intake. I recommend a whole food, plant-based diet for your overall health. ACTIVITY:  You may resume your normal daily activities. It is recommended that you spend the remainder of the day resting - avoid any strenuous activity. CALL M.D. IF ANY SIGN OF:   Increasing pain, nausea, vomiting  Abdominal distension (swelling)  Significant bleeding (oral or rectal)  Fever   Pain in chest area  Shortness of breath    Additional Instructions:   Call Dr. Radhika Barrett if any questions or problems at [phone]   You should receive the biopsy results by phone or mail within 3 weeks, if not, call  my office for the results      Should have a repeat colonoscopy in 5 years. Colonoscopy showed small polyp removed, moderate diverticulosis, and small internal hemorrhoids. Learning About Coronavirus [phone])  Coronavirus [phone]): Overview  What is coronavirus (COVID-19)? The coronavirus disease (COVID-19) is caused by a virus. It is an illness that was first found in [place], [place], in December 2019. It has since spread worldwide.   The virus can cause fever, cough, and trouble breathing. In severe cases, it can cause pneumonia and make it hard to breathe without help. It can cause death. Coronaviruses are a large group of viruses. They cause the common cold. They also cause more serious illnesses like Middle East respiratory syndrome (MERS) and severe acute respiratory syndrome (SARS). COVID-19 is caused by a novel coronavirus. That means it's a new type that has not been seen in people before. This virus spreads person-to-person through droplets from coughing and sneezing. It can also spread when you are close to someone who is infected. And it can spread when you touch something that has the virus on it, such as a doorknob or a tabletop. What can you do to protect yourself from coronavirus (COVID-19)? The best way to protect yourself from getting sick is to:  · Avoid areas where there is an outbreak. · Avoid contact with people who may be infected. · Wash your hands often with soap or alcohol-based hand sanitizers. · Avoid crowds and try to stay at least 6 feet away from other people. · Wash your hands often, especially after you cough or sneeze. Use soap and water, and scrub for at least 20 seconds. If soap and water aren't available, use an alcohol-based hand . · Avoid touching your mouth, nose, and eyes. What can you do to avoid spreading the virus to others? To help avoid spreading the virus to others:  · Cover your mouth with a tissue when you cough or sneeze. Then throw the tissue in the trash. · Use a disinfectant to clean things that you touch often. · Stay home if you are sick or have been exposed to the virus. Don't go to school, work, or public areas. And don't use public transportation. · If you are sick:  ? Leave your home only if you need to get medical care. But call the doctor's office first so they know you're coming.  And wear a face mask, if you have one.  ? If you have a face mask, wear it whenever you're around other people. It can help stop the spread of the virus when you cough or sneeze. ? Clean and disinfect your home every day. Use household  and disinfectant wipes or sprays. Take special care to clean things that you grab with your hands. These include doorknobs, remote controls, phones, and handles on your refrigerator and microwave. And don't forget countertops, tabletops, bathrooms, and computer keyboards. When to call for help  Call 911 anytime you think you may need emergency care. For example, call if:  · You have severe trouble breathing. (You can't talk at all.)  · You have constant chest pain or pressure. · You are severely dizzy or lightheaded. · You are confused or can't think clearly. · Your face and lips have a blue color. · You pass out (lose consciousness) or are very hard to wake up. Call your doctor now if you develop symptoms such as:  · Shortness of breath. · Fever. · Cough. If you need to get care, call ahead to the doctor's office for instructions before you go. Make sure you wear a face mask, if you have one, to prevent exposing other people to the virus. Where can you get the latest information? The following health organizations are tracking and studying this virus. Their websites contain the most up-to-date information. Haleigh Escaleras also learn what to do if you think you may have been exposed to the virus. · U.S. Centers for Disease Control and Prevention (CDC): The CDC provides updated news about the disease and travel advice. The website also tells you how to prevent the spread of infection. www.cdc.gov  · World Health Organization [facility]): WHO offers information about the virus outbreaks. WHO also has travel advice. www.who.int  Current as of: April 1, 2020               Content Version: 12.4  © 4933-1219 [employer].    Care instructions adapted under license by your healthcare professional. If you have questions about a medical condition or this instruction, always ask your healthcare professional. [place] [address] any warranty or liability for your use of this information.

## 2020-06-25 ENCOUNTER — OFFICE VISIT (OUTPATIENT)
Dept: INTERNAL MEDICINE CLINIC | Age: 65
End: 2020-06-25

## 2020-06-25 ENCOUNTER — HOSPITAL ENCOUNTER (OUTPATIENT)
Dept: LAB | Age: 65
Discharge: HOME | End: 2020-06-25
Payer: MEDICARE

## 2020-06-25 VITALS
HEART RATE: 75 BPM | HEIGHT: 73 IN | DIASTOLIC BLOOD PRESSURE: 81 MMHG | SYSTOLIC BLOOD PRESSURE: 128 MMHG | TEMPERATURE: 98.6 F | RESPIRATION RATE: 12 BRPM | OXYGEN SATURATION: 98 % | WEIGHT: 236 LBS | BODY MASS INDEX: 31.28 KG/M2

## 2020-06-25 DIAGNOSIS — Z23 ENCOUNTER FOR IMMUNIZATION: ICD-10-CM

## 2020-06-25 DIAGNOSIS — E78.2 MIXED HYPERLIPIDEMIA: ICD-10-CM

## 2020-06-25 DIAGNOSIS — Z12.5 PROSTATE CANCER SCREENING: ICD-10-CM

## 2020-06-25 DIAGNOSIS — Z00.00 WELCOME TO MEDICARE PREVENTIVE VISIT: Primary | ICD-10-CM

## 2020-06-25 PROCEDURE — 80061 LIPID PANEL: CPT

## 2020-06-25 PROCEDURE — 85025 COMPLETE CBC W/AUTO DIFF WBC: CPT

## 2020-06-25 PROCEDURE — 36415 COLL VENOUS BLD VENIPUNCTURE: CPT

## 2020-06-25 PROCEDURE — 84153 ASSAY OF PSA TOTAL: CPT

## 2020-06-25 PROCEDURE — 84443 ASSAY THYROID STIM HORMONE: CPT

## 2020-06-25 PROCEDURE — 80053 COMPREHEN METABOLIC PANEL: CPT

## 2020-06-25 RX ORDER — DICLOFENAC SODIUM 75 MG/1
75 TABLET, DELAYED RELEASE ORAL 2 TIMES DAILY
COMMUNITY
Start: 2020-06-18 | End: 2021-12-08 | Stop reason: ALTCHOICE

## 2020-06-25 RX ORDER — ZOSTER VACCINE RECOMBINANT, ADJUVANTED 50 MCG/0.5
0.5 KIT INTRAMUSCULAR ONCE
Qty: 0.5 ML | Refills: 1 | Status: SHIPPED | OUTPATIENT
Start: 2020-06-25 | End: 2020-06-25

## 2020-06-25 NOTE — PATIENT INSTRUCTIONS
Vaccine Information Statement Pneumococcal Polysaccharide Vaccine (PPSV23): What You Need to Know Many Vaccine Information Statements are available in Polish and other languages. See www.immunize.org/vis Hojas de información sobre vacunas están disponibles en español y en muchos otros idiomas. Visite www.immunize.org/vis 1. Why get vaccinated? Pneumococcal polysaccharide vaccine (PPSV23) can prevent pneumococcal disease. Pneumococcal disease refers to any illness caused by pneumococcal bacteria. These bacteria can cause many types of illnesses, including pneumonia, which is an infection of the lungs. Pneumococcal bacteria are one of the most common causes of pneumonia. Besides pneumonia, pneumococcal bacteria can also cause: 
 Ear infections  Sinus infections  Meningitis (infection of the tissue covering the brain and spinal cord)  Bacteremia (bloodstream infection) Anyone can get pneumococcal disease, but children under 3years of age, people with certain medical conditions, adults 72 years or older, and cigarette smokers are at the highest risk. Most pneumococcal infections are mild. However, some can result in long-term problems, such as brain damage or hearing loss. Meningitis, bacteremia, and pneumonia caused by pneumococcal disease can be fatal.  
 
2. PPSV23  
 
PPSV23 protects against 23 types of bacteria that cause pneumococcal disease. PPSV23 is recommended for:  All adults 72 years or older,  Anyone 2 years or older with certain medical conditions that can lead to an increased risk for pneumococcal disease. Most people need only one dose of PPSV23. A second dose of PPSV23, and another type of pneumococcal vaccine called PCV13, are recommended for certain high-risk groups. Your health care provider can give you more information. People 65 years or older should get a dose of PPSV23 even if they have already gotten one or more doses of the vaccine before they turned 72. 
 
3. Talk with your health care provider Tell your vaccine provider if the person getting the vaccine: 
 Has had an allergic reaction after a previous dose of PPSV23, or has any severe, life-threatening allergies. In some cases, your health care provider may decide to postpone PPSV23 vaccination to a future visit. People with minor illnesses, such as a cold, may be vaccinated. People who are moderately or severely ill should usually wait until they recover before getting PPSV23. Your health care provider can give you more information. 4. Risks of a vaccine reaction  Redness or pain where the shot is given, feeling tired, fever, or muscle aches can happen after PPSV23. People sometimes faint after medical procedures, including vaccination. Tell your provider if you feel dizzy or have vision changes or ringing in the ears. As with any medicine, there is a very remote chance of a vaccine causing a severe allergic reaction, other serious injury, or death. 5. What if there is a serious problem? An allergic reaction could occur after the vaccinated person leaves the clinic. If you see signs of a severe allergic reaction (hives, swelling of the face and throat, difficulty breathing, a fast heartbeat, dizziness, or weakness), call 9-1-1 and get the person to the nearest hospital. 
 
For other signs that concern you, call your health care provider. Adverse reactions should be reported to the Vaccine Adverse Event Reporting System (VAERS). Your health care provider will usually file this report, or you can do it yourself. Visit the VAERS website at www.vaers. hhs.gov or call 4-440.672.7208. VAERS is only for reporting reactions, and VAERS staff do not give medical advice. 6. How can I learn more?  Ask your health care provider.  Call your local or state health department.  Contact the Centers for Disease Control and Prevention (CDC): 
- Call 0-545.383.8284 (1-800-CDC-INFO) or 
- Visit CDCs website at www.cdc.gov/vaccines Vaccine Information Statement PPSV23  
10/30/2019 Department of [facility] and [employer] Centers for Disease Control and Prevention Office Use Only Medicare Wellness Visit, Male The best way to live healthy is to have a lifestyle where you eat a well-balanced diet, exercise regularly, limit alcohol use, and quit all forms of tobacco/nicotine, if applicable. Regular preventive services are another way to keep healthy. Preventive services (vaccines, screening tests, monitoring & exams) can help personalize your care plan, which helps you manage your own care. Screening tests can find health problems at the earliest stages, when they are easiest to treat. [facility] follows the current, evidence-based guidelines published by the [facility] Kevin Alejandra (USPSTF) when recommending preventive services for our patients. Because we follow these guidelines, sometimes recommendations change over time as research supports it. (For example, a prostate screening blood test is no longer routinely recommended for men with no symptoms). Of course, you and your doctor may decide to screen more often for some diseases, based on your risk and co-morbidities (chronic disease you are already diagnosed with). Preventive services for you include: - Medicare offers their members a free annual wellness visit, which is time for you and your primary care provider to discuss and plan for your preventive service needs. Take advantage of this benefit every year! 
-All adults over age 72 should receive the recommended pneumonia vaccines. Current USPSTF guidelines recommend a series of two vaccines for the best pneumonia protection. -All adults should have a flu vaccine yearly and tetanus vaccine every 10 years. 
-All adults age 48 and older should receive the shingles vaccines (series of two vaccines). -All adults age 38-68 who are overweight should have a diabetes screening test once every three years.  
-Other screening tests & preventive services for persons with diabetes include: an eye exam to screen for diabetic retinopathy, a kidney function test, a foot exam, and stricter control over your cholesterol.  
-Cardiovascular screening for adults with routine risk involves an electrocardiogram (ECG) at intervals determined by the provider.  
-Colorectal cancer screening should be done for adults age 54-65 with no increased risk factors for colorectal cancer. There are a number of acceptable methods of screening for this type of cancer. Each test has its own benefits and drawbacks. Discuss with your provider what is most appropriate for you during your annual wellness visit. The different tests include: colonoscopy (considered the best screening method), a fecal occult blood test, a fecal DNA test, and sigmoidoscopy. 
-All adults born between [facility] should be screened once for Hepatitis C. 
-An Abdominal Aortic Aneurysm (AAA) Screening is recommended for men age 73-68 who has ever smoked in their lifetime. Here is a list of your current Health Maintenance items (your personalized list of preventive services) with a due date: 
Health Maintenance Due Topic Date Due  Shingles Vaccine (1 of 2) 09/18/2004  Glaucoma Screening   09/18/2019  Pneumococcal Vaccine (1 of 1 - PPSV23) 09/18/2019 [address] [place] [address] Annual Well Visit  06/02/2020

## 2020-06-25 NOTE — PROGRESS NOTES
This is a \"Welcome to [address]"  Initial Preventive Physical Examination (IPPE) providing Personalized Prevention Plan Services (Performed in the first 12 months of enrollment)    I have reviewed the patient's medical history in detail and updated the computerized patient record. As well as a follow-up of his health issues. His weight is up about 10 pounds and he is struggling to keep it off. He has had recent injury to the right knee and is having an MRI done next week. No locking, popping, or giving way. The Voltaren he was given by the orthopedist has helped. History   History reviewed. No pertinent past medical history. Past Surgical History:   Procedure Laterality Date    COLONOSCOPY N/A 6/2/2020    COLONOSCOPY performed by Rod See MD at [address] HX COLONOSCOPY  04/17/2017    Dr. [facility]      2017 colonoscopy    HX TONSILLECTOMY      as a kid     Current Outpatient Medications   Medication Sig Dispense Refill    diclofenac EC (VOLTAREN) 75 mg EC tablet Take 75 mg by mouth two (2) times a day.  multivitamin (ONE A DAY) tablet Take 1 Tab by mouth daily. No Known Allergies    Family History   Problem Relation Age of Onset    Diabetes Mother     Thyroid Disease Mother     Elevated Lipids Mother     Prostate Cancer Father 67    Thyroid Disease Daughter      Social History     Tobacco Use    Smoking status: Never Smoker    Smokeless tobacco: Never Used    Tobacco comment: cigar   Substance Use Topics    Alcohol use: Yes     Alcohol/week: 1.0 standard drinks     Types: 1 Shots of liquor per week       Depression Risk Screen     3 most recent PHQ Screens 6/25/2020   Little interest or pleasure in doing things Not at all   Feeling down, depressed, irritable, or hopeless Not at all   Total Score PHQ 2 0       Alcohol Risk Factor Screening (MALE > 65):    Do you average more 1 drink per night or more than 7 drinks a week: No    In the past three months have you have had more than 4 drinks containing alcohol on one occasion: No      Functional Ability and Level of Safety   Diet: No special diet     Hearing: Hearing is good. Vision Screening:  Vision is good. No exam data present     Activities of Daily Living: The home contains: no safety equipment. Patient does total self care     Ambulation: with no difficulty     Exercise level: moderately active     Fall Risk Screen:  Fall Risk Assessment, last 12 mths 6/25/2020   Able to walk? Yes   Fall in past 12 months? No     Abuse Screen:  Patient is not abused         ROS - Per HPI      Physical Examination: General appearance - alert, well appearing, and in no distress  Eyes - pupils equal and reactive, extraocular eye movements intact  Ears - bilateral TM's and external ear canals normal  Nose - normal and patent, no erythema, discharge or polyps  Mouth - mucous membranes moist, pharynx normal without lesions  Neck - supple, no significant adenopathy  Lymphatics - no palpable lymphadenopathy, no hepatosplenomegaly  Chest - clear to auscultation, no wheezes, rales or rhonchi, symmetric air entry  Heart - normal rate and regular rhythm  Abdomen - soft, nontender, nondistended, no masses or organomegaly  Back exam - full range of motion, no tenderness, palpable spasm or pain on motion  Neurological - alert, oriented, normal speech, no focal findings or movement disorder noted  Musculoskeletal - no joint tenderness, deformity or swelling, abnormal exam of right knee with brace in place. Extremities - peripheral pulses normal, no pedal edema, no clubbing or cyanosis    Screening EKG   EKG order placed: Yes    Patient Care Team   Patient Care Team:  France Palma MD as PCP - General  France Palma MD as PCP - [facility]  Gab Pearson MD (Gastroenterology)     End of Life Planning   Advanced care planning directives were discussed with the patient and /or family/caregiver. Assessment/Plan   Education and counseling provided:  Are appropriate based on today's review and evaluation  End-of-Life planning (with patient's consent)  Pneumococcal Vaccine  Influenza Vaccine  Prostate cancer screening tests (PSA, covered annually)  Diabetes screening test    Diagnoses and all orders for this visit:    1. Encounter for immunization  -     PNEUMOCOCCAL POLYSACCHARIDE VACCINE, 23-VALENT, ADULT OR IMMUNOSUPPRESSED PT DOSE,  -     ADMIN INFLUENZA VIRUS VAC    2. Mixed hyperlipidemia -diet controlled. Will check levels to be sure that that is adequate. -     CBC WITH AUTOMATED DIFF  -     METABOLIC PANEL, COMPREHENSIVE  -     LIPID PANEL  -     TSH RFX ON ABNORMAL TO FREE T4    3. Prostate cancer screening  -     PSA SCREENING (SCREENING)    4. Welcome to Medicare preventive visit      5. Weight issuesencouraged increased exercise and carbohydrate limiting. 6.  Knee injury? Meniscus tear. Will await MRI result. Other orders  -     varicella-zoster recombinant, PF, (Shingrix, PF,) 50 mcg/0.5 mL susr injection; 0.5 mL by IntraMUSCular route once for 1 dose.   -     AMB POC EKG ROUTINE W/ 12 LEADS, SCREEN ()        Health Maintenance Due   Topic Date Due    Shingrix Vaccine Age 49> (1 of 2) 09/18/2004    GLAUCOMA SCREENING Q2Y  09/18/2019    Pneumococcal 65+ years (1 of 1 - PPSV23) 09/18/2019    Medicare Yearly Exam  06/02/2020

## 2020-06-26 LAB
ALBUMIN SERPL-MCNC: 4.3 G/DL (ref 3.8–4.8)
ALBUMIN/GLOB SERPL: 1.7 {RATIO} (ref 1.2–2.2)
ALP SERPL-CCNC: 58 IU/L (ref 39–117)
ALT SERPL-CCNC: 22 IU/L (ref 0–44)
AST SERPL-CCNC: 18 IU/L (ref 0–40)
BASOPHILS # BLD AUTO: 0.1 X10E3/UL (ref 0–0.2)
BASOPHILS NFR BLD AUTO: 1 %
BILIRUB SERPL-MCNC: 0.5 MG/DL (ref 0–1.2)
BUN SERPL-MCNC: 21 MG/DL (ref 8–27)
BUN/CREAT SERPL: 27 (ref 10–24)
CALCIUM SERPL-MCNC: 9.1 MG/DL (ref 8.6–10.2)
CHLORIDE SERPL-SCNC: 102 MMOL/L (ref 96–106)
CHOLEST SERPL-MCNC: 189 MG/DL (ref 100–199)
CO2 SERPL-SCNC: 21 MMOL/L (ref 20–29)
CREAT SERPL-MCNC: 0.77 MG/DL (ref 0.76–1.27)
EOSINOPHIL # BLD AUTO: 0.2 X10E3/UL (ref 0–0.4)
EOSINOPHIL NFR BLD AUTO: 3 %
ERYTHROCYTE [DISTWIDTH] IN BLOOD BY AUTOMATED COUNT: 12.7 % (ref 11.6–15.4)
GLOBULIN SER CALC-MCNC: 2.5 G/DL (ref 1.5–4.5)
GLUCOSE SERPL-MCNC: 91 MG/DL (ref 65–99)
HCT VFR BLD AUTO: 44.8 % (ref 37.5–51)
HDLC SERPL-MCNC: 34 MG/DL
HGB BLD-MCNC: 15.1 G/DL (ref 13–17.7)
IMM GRANULOCYTES # BLD AUTO: 0 X10E3/UL (ref 0–0.1)
IMM GRANULOCYTES NFR BLD AUTO: 0 %
LDLC SERPL CALC-MCNC: 133 MG/DL (ref 0–99)
LYMPHOCYTES # BLD AUTO: 1.8 X10E3/UL (ref 0.7–3.1)
LYMPHOCYTES NFR BLD AUTO: 30 %
MCH RBC QN AUTO: 30.7 PG (ref 26.6–33)
MCHC RBC AUTO-ENTMCNC: 33.7 G/DL (ref 31.5–35.7)
MCV RBC AUTO: 91 FL (ref 79–97)
MONOCYTES # BLD AUTO: 0.6 X10E3/UL (ref 0.1–0.9)
MONOCYTES NFR BLD AUTO: 9 %
NEUTROPHILS # BLD AUTO: 3.5 X10E3/UL (ref 1.4–7)
NEUTROPHILS NFR BLD AUTO: 57 %
PLATELET # BLD AUTO: 215 X10E3/UL (ref 150–450)
POTASSIUM SERPL-SCNC: 4.4 MMOL/L (ref 3.5–5.2)
PROT SERPL-MCNC: 6.8 G/DL (ref 6–8.5)
PSA SERPL-MCNC: 0.4 NG/ML (ref 0–4)
RBC # BLD AUTO: 4.92 X10E6/UL (ref 4.14–5.8)
SODIUM SERPL-SCNC: 141 MMOL/L (ref 134–144)
TRIGL SERPL-MCNC: 108 MG/DL (ref 0–149)
TSH SERPL DL<=0.005 MIU/L-ACNC: 1.36 UIU/ML (ref 0.45–4.5)
VLDLC SERPL CALC-MCNC: 22 MG/DL (ref 5–40)
WBC # BLD AUTO: 6.2 X10E3/UL (ref 3.4–10.8)

## 2021-12-08 ENCOUNTER — OFFICE VISIT (OUTPATIENT)
Dept: INTERNAL MEDICINE CLINIC | Age: 66
End: 2021-12-08
Payer: MEDICARE

## 2021-12-08 VITALS
BODY MASS INDEX: 35.52 KG/M2 | HEART RATE: 72 BPM | RESPIRATION RATE: 16 BRPM | SYSTOLIC BLOOD PRESSURE: 139 MMHG | WEIGHT: 268 LBS | HEIGHT: 73 IN | TEMPERATURE: 97.5 F | OXYGEN SATURATION: 99 % | DIASTOLIC BLOOD PRESSURE: 86 MMHG

## 2021-12-08 DIAGNOSIS — E78.2 MIXED HYPERLIPIDEMIA: Primary | ICD-10-CM

## 2021-12-08 DIAGNOSIS — Z00.00 MEDICARE ANNUAL WELLNESS VISIT, SUBSEQUENT: ICD-10-CM

## 2021-12-08 DIAGNOSIS — Z12.5 PROSTATE CANCER SCREENING: ICD-10-CM

## 2021-12-08 DIAGNOSIS — E66.01 SEVERE OBESITY (BMI 35.0-35.9 WITH COMORBIDITY) (HCC): ICD-10-CM

## 2021-12-08 PROCEDURE — G8417 CALC BMI ABV UP PARAM F/U: HCPCS | Performed by: INTERNAL MEDICINE

## 2021-12-08 PROCEDURE — G8427 DOCREV CUR MEDS BY ELIG CLIN: HCPCS | Performed by: INTERNAL MEDICINE

## 2021-12-08 PROCEDURE — 3017F COLORECTAL CA SCREEN DOC REV: CPT | Performed by: INTERNAL MEDICINE

## 2021-12-08 PROCEDURE — G0439 PPPS, SUBSEQ VISIT: HCPCS | Performed by: INTERNAL MEDICINE

## 2021-12-08 PROCEDURE — 1101F PT FALLS ASSESS-DOCD LE1/YR: CPT | Performed by: INTERNAL MEDICINE

## 2021-12-08 PROCEDURE — G8536 NO DOC ELDER MAL SCRN: HCPCS | Performed by: INTERNAL MEDICINE

## 2021-12-08 PROCEDURE — G8510 SCR DEP NEG, NO PLAN REQD: HCPCS | Performed by: INTERNAL MEDICINE

## 2021-12-08 RX ORDER — ZOSTER VACCINE LIVE 19400 [PFU]/.65ML
0.65 INJECTION, POWDER, LYOPHILIZED, FOR SUSPENSION SUBCUTANEOUS ONCE
Qty: 0.5 ML | Refills: 0 | Status: SHIPPED | OUTPATIENT
Start: 2021-12-08 | End: 2021-12-08

## 2021-12-08 NOTE — PROGRESS NOTES
This is the Subsequent Medicare Annual Wellness Exam, performed 12 months or more after the Initial AWV or the last Subsequent AWV    I have reviewed the patient's medical history in detail and updated the computerized patient record. Also for a followup visit. His weight is up and he is active in the yard but no exercise. ROS - Otherwise negative. Recent plantar fasciitis that is better and right knee pain that is better as well. Physical Examination: General appearance - alert, well appearing, and in no distress  Ears - bilateral TM's and external ear canals normal  Mouth - mucous membranes moist, pharynx normal without lesions  Neck - supple, no significant adenopathy  Lymphatics - no palpable lymphadenopathy, no hepatosplenomegaly  Chest - clear to auscultation, no wheezes, rales or rhonchi, symmetric air entry  Heart - normal rate, regular rhythm, normal S1, S2, no murmurs, rubs, clicks or gallops  Abdomen - soft, nontender, nondistended, no masses or organomegaly  Neurological - alert, oriented, normal speech, no focal findings or movement disorder noted  Musculoskeletal - no joint tenderness, deformity or swelling  Extremities - peripheral pulses normal, no pedal edema, no clubbing or cyanosis        Assessment/Plan   Education and counseling provided:  Are appropriate based on today's review and evaluation    1. Mixed hyperlipidemia-diet controlled. Will check labs to be sure this is adequate. 2. Severe obesity (BMI 35.0-35.9 with comorbidity) (HCC)-discussed low carbohydrate diet and increased aerobic exercise for better control of this.   3. Medicare annual wellness visit, subsequent       Depression Risk Factor Screening     3 most recent PHQ Screens 12/8/2021   Little interest or pleasure in doing things Not at all   Feeling down, depressed, irritable, or hopeless Not at all   Total Score PHQ 2 0       Alcohol Risk Screen    Do you average more than 1 drink per night or more than 7 drinks a week: No    In the past three months have you have had more than 4 drinks containing alcohol on one occasion: No        Functional Ability and Level of Safety    Hearing: Hearing is good. Activities of Daily Living: The home contains: no safety equipment. Patient does total self care      Ambulation: with no difficulty     Fall Risk:  Fall Risk Assessment, last 12 mths 12/8/2021   Able to walk? Yes   Fall in past 12 months? 1   Do you feel unsteady? 0   Are you worried about falling 0   Number of falls in past 12 months 2   Fall with injury? 0      Abuse Screen:  Patient is not abused       Cognitive Screening    Has your family/caregiver stated any concerns about your memory: no         Health Maintenance Due     Health Maintenance Due   Topic Date Due    Shingrix Vaccine Age 49> (1 of 2) Never done    COVID-19 Vaccine (2 - Booster for [employer] series) 06/25/2021    Flu Vaccine (1) Never done       Patient Care Team   Patient Care Team:  Clyde Moralez MD as PCP - General  [facility]ly[facility]  Elo Sears MD (Gastroenterology)    History     Patient Active Problem List   Diagnosis Code    Mixed hyperlipidemia E78.2     History reviewed. No pertinent past medical history. Past Surgical History:   Procedure Laterality Date    COLONOSCOPY N/A 6/2/2020    [facility] Elo [facility] at [facility]COPY    HX COLONOSCOPY  04/17/2017    Dr. Kelly Finn [facility]      2017 colonoscopy    HX TONSILLECTOMY      as a kid     Current Outpatient Medications   Medication Sig Dispense Refill    multivitamin (ONE A DAY) tablet Take 1 Tab by mouth daily.        No Known Allergies    Family History   Problem Relation Age of Onset    Diabetes Mother     Thyroid Disease Mother     Elevated Lipids Mother     Prostate Cancer Father 67    Heart Disease Brother     Thyroid Disease Daughter      Social History     Tobacco Use    Smoking status: Never Smoker  Smokeless tobacco: Never Used    Tobacco comment: cigar   Substance Use Topics    Alcohol use:  Yes     Alcohol/week: 1.0 standard drink     Types: 1 Shots of liquor per week         Demond Dan MD

## 2021-12-08 NOTE — PATIENT INSTRUCTIONS
Medicare Wellness Visit, Male    The best way to live healthy is to have a lifestyle where you eat a well-balanced diet, exercise regularly, limit alcohol use, and quit all forms of tobacco/nicotine, if applicable. Regular preventive services are another way to keep healthy. Preventive services (vaccines, screening tests, monitoring & exams) can help personalize your care plan, which helps you manage your own care. Screening tests can find health problems at the earliest stages, when they are easiest to treat. [facility] follows the current, evidence-based guidelines published by the [facility] Kevin Alejandra ([facility]) when recommending preventive services for our patients. Because we follow these guidelines, sometimes recommendations change over time as research supports it. (For example, a prostate screening blood test is no longer routinely recommended for men with no symptoms). Of course, you and your doctor may decide to screen more often for some diseases, based on your risk and co-morbidities (chronic disease you are already diagnosed with). Preventive services for you include:  - Medicare offers their members a free annual wellness visit, which is time for you and your primary care provider to discuss and plan for your preventive service needs. Take advantage of this benefit every year!  -All adults over age 72 should receive the recommended pneumonia vaccines. Current USPSTF guidelines recommend a series of two vaccines for the best pneumonia protection.   -All adults should have a flu vaccine yearly and tetanus vaccine every 10 years.  -All adults age 48 and older should receive the shingles vaccines (series of two vaccines).        -All adults age 38-68 who are overweight should have a diabetes screening test once every three years.   -Other screening tests & preventive services for persons with diabetes include: an eye exam to screen for diabetic retinopathy, a kidney function test, a foot exam, and stricter control over your cholesterol.   -Cardiovascular screening for adults with routine risk involves an electrocardiogram (ECG) at intervals determined by the provider.   -Colorectal cancer screening should be done for adults age 54-65 with no increased risk factors for colorectal cancer. There are a number of acceptable methods of screening for this type of cancer. Each test has its own benefits and drawbacks. Discuss with your provider what is most appropriate for you during your annual wellness visit. The different tests include: colonoscopy (considered the best screening method), a fecal occult blood test, a fecal DNA test, and sigmoidoscopy.  -All adults born between [facility] should be screened once for Hepatitis C.  -An Abdominal Aortic Aneurysm (AAA) Screening is recommended for men age 73-68 who has ever smoked in their lifetime.      Here is a list of your current Health Maintenance items (your personalized list of preventive services) with a due date:  Health Maintenance Due   Topic Date Due    Shingles Vaccine (1 of 2) Never done    COVID-19 Vaccine (2 - Booster for [employer] series) 06/25/2021    Yearly Flu Vaccine (1) Never done

## 2022-03-19 PROBLEM — E78.2 MIXED HYPERLIPIDEMIA: Status: ACTIVE | Noted: 2020-06-25

## 2024-08-19 SDOH — ECONOMIC STABILITY: TRANSPORTATION INSECURITY
IN THE PAST 12 MONTHS, HAS LACK OF TRANSPORTATION KEPT YOU FROM MEETINGS, WORK, OR FROM GETTING THINGS NEEDED FOR DAILY LIVING?: NO

## 2024-08-19 SDOH — ECONOMIC STABILITY: FOOD INSECURITY: WITHIN THE PAST 12 MONTHS, THE FOOD YOU BOUGHT JUST DIDN'T LAST AND YOU DIDN'T HAVE MONEY TO GET MORE.: NEVER TRUE

## 2024-08-19 SDOH — ECONOMIC STABILITY: FOOD INSECURITY: WITHIN THE PAST 12 MONTHS, YOU WORRIED THAT YOUR FOOD WOULD RUN OUT BEFORE YOU GOT MONEY TO BUY MORE.: NEVER TRUE

## 2024-08-19 SDOH — ECONOMIC STABILITY: INCOME INSECURITY: HOW HARD IS IT FOR YOU TO PAY FOR THE VERY BASICS LIKE FOOD, HOUSING, MEDICAL CARE, AND HEATING?: NOT HARD AT ALL

## 2024-08-19 SDOH — HEALTH STABILITY: PHYSICAL HEALTH: ON AVERAGE, HOW MANY DAYS PER WEEK DO YOU ENGAGE IN MODERATE TO STRENUOUS EXERCISE (LIKE A BRISK WALK)?: 4 DAYS

## 2024-08-19 ASSESSMENT — PATIENT HEALTH QUESTIONNAIRE - PHQ9
SUM OF ALL RESPONSES TO PHQ QUESTIONS 1-9: 0
SUM OF ALL RESPONSES TO PHQ9 QUESTIONS 1 & 2: 0
SUM OF ALL RESPONSES TO PHQ QUESTIONS 1-9: 0
SUM OF ALL RESPONSES TO PHQ QUESTIONS 1-9: 0
2. FEELING DOWN, DEPRESSED OR HOPELESS: NOT AT ALL
1. LITTLE INTEREST OR PLEASURE IN DOING THINGS: NOT AT ALL
SUM OF ALL RESPONSES TO PHQ QUESTIONS 1-9: 0

## 2024-08-19 ASSESSMENT — LIFESTYLE VARIABLES
HOW OFTEN DO YOU HAVE A DRINK CONTAINING ALCOHOL: 3
HOW OFTEN DO YOU HAVE SIX OR MORE DRINKS ON ONE OCCASION: 1
HOW MANY STANDARD DRINKS CONTAINING ALCOHOL DO YOU HAVE ON A TYPICAL DAY: 1
HOW OFTEN DO YOU HAVE A DRINK CONTAINING ALCOHOL: 2-4 TIMES A MONTH
HOW MANY STANDARD DRINKS CONTAINING ALCOHOL DO YOU HAVE ON A TYPICAL DAY: 1 OR 2

## 2024-08-20 ENCOUNTER — OFFICE VISIT (OUTPATIENT)
Age: 69
End: 2024-08-20
Payer: MEDICARE

## 2024-08-20 VITALS
HEART RATE: 68 BPM | TEMPERATURE: 98.1 F | BODY MASS INDEX: 33.74 KG/M2 | RESPIRATION RATE: 15 BRPM | WEIGHT: 254.6 LBS | SYSTOLIC BLOOD PRESSURE: 137 MMHG | OXYGEN SATURATION: 97 % | DIASTOLIC BLOOD PRESSURE: 87 MMHG | HEIGHT: 73 IN

## 2024-08-20 DIAGNOSIS — E78.2 MIXED HYPERLIPIDEMIA: ICD-10-CM

## 2024-08-20 DIAGNOSIS — Z12.5 PROSTATE CANCER SCREENING: ICD-10-CM

## 2024-08-20 DIAGNOSIS — Z00.00 MEDICARE ANNUAL WELLNESS VISIT, SUBSEQUENT: Primary | ICD-10-CM

## 2024-08-20 DIAGNOSIS — M17.10 ARTHRITIS OF KNEE: ICD-10-CM

## 2024-08-20 LAB
ALBUMIN SERPL-MCNC: 3.9 G/DL (ref 3.5–5)
ALBUMIN/GLOB SERPL: 1.2 (ref 1.1–2.2)
ALP SERPL-CCNC: 67 U/L (ref 45–117)
ALT SERPL-CCNC: 29 U/L (ref 12–78)
ANION GAP SERPL CALC-SCNC: 2 MMOL/L (ref 5–15)
AST SERPL-CCNC: 18 U/L (ref 15–37)
BASOPHILS # BLD: 0.1 K/UL (ref 0–0.1)
BASOPHILS NFR BLD: 1 % (ref 0–1)
BILIRUB SERPL-MCNC: 0.6 MG/DL (ref 0.2–1)
BUN SERPL-MCNC: 19 MG/DL (ref 6–20)
BUN/CREAT SERPL: 23 (ref 12–20)
CALCIUM SERPL-MCNC: 9.1 MG/DL (ref 8.5–10.1)
CHLORIDE SERPL-SCNC: 107 MMOL/L (ref 97–108)
CHOLEST SERPL-MCNC: 201 MG/DL
CO2 SERPL-SCNC: 30 MMOL/L (ref 21–32)
CREAT SERPL-MCNC: 0.81 MG/DL (ref 0.7–1.3)
DIFFERENTIAL METHOD BLD: NORMAL
EOSINOPHIL # BLD: 0.1 K/UL (ref 0–0.4)
EOSINOPHIL NFR BLD: 1 % (ref 0–7)
ERYTHROCYTE [DISTWIDTH] IN BLOOD BY AUTOMATED COUNT: 13.2 % (ref 11.5–14.5)
GLOBULIN SER CALC-MCNC: 3.3 G/DL (ref 2–4)
GLUCOSE SERPL-MCNC: 95 MG/DL (ref 65–100)
HCT VFR BLD AUTO: 46.5 % (ref 36.6–50.3)
HDLC SERPL-MCNC: 36 MG/DL
HDLC SERPL: 5.6 (ref 0–5)
HGB BLD-MCNC: 15 G/DL (ref 12.1–17)
IMM GRANULOCYTES # BLD AUTO: 0 K/UL (ref 0–0.04)
IMM GRANULOCYTES NFR BLD AUTO: 0 % (ref 0–0.5)
LDLC SERPL CALC-MCNC: 139.6 MG/DL (ref 0–100)
LYMPHOCYTES # BLD: 2.4 K/UL (ref 0.8–3.5)
LYMPHOCYTES NFR BLD: 32 % (ref 12–49)
MCH RBC QN AUTO: 31.1 PG (ref 26–34)
MCHC RBC AUTO-ENTMCNC: 32.3 G/DL (ref 30–36.5)
MCV RBC AUTO: 96.3 FL (ref 80–99)
MONOCYTES # BLD: 0.7 K/UL (ref 0–1)
MONOCYTES NFR BLD: 9 % (ref 5–13)
NEUTS SEG # BLD: 4.3 K/UL (ref 1.8–8)
NEUTS SEG NFR BLD: 57 % (ref 32–75)
NRBC # BLD: 0 K/UL (ref 0–0.01)
NRBC BLD-RTO: 0 PER 100 WBC
PLATELET # BLD AUTO: 204 K/UL (ref 150–400)
PMV BLD AUTO: 11 FL (ref 8.9–12.9)
POTASSIUM SERPL-SCNC: 4 MMOL/L (ref 3.5–5.1)
PROT SERPL-MCNC: 7.2 G/DL (ref 6.4–8.2)
PSA SERPL-MCNC: 0.5 NG/ML (ref 0.01–4)
RBC # BLD AUTO: 4.83 M/UL (ref 4.1–5.7)
SODIUM SERPL-SCNC: 139 MMOL/L (ref 136–145)
TRIGL SERPL-MCNC: 127 MG/DL
TSH SERPL DL<=0.05 MIU/L-ACNC: 0.94 UIU/ML (ref 0.36–3.74)
VLDLC SERPL CALC-MCNC: 25.4 MG/DL
WBC # BLD AUTO: 7.6 K/UL (ref 4.1–11.1)

## 2024-08-20 PROCEDURE — 99213 OFFICE O/P EST LOW 20 MIN: CPT | Performed by: INTERNAL MEDICINE

## 2024-08-20 NOTE — PROGRESS NOTES
alert and oriented to person, place and time, well-developed and well-nourished, in no acute distress  Head: normocephalic and atraumatic  ENT: tympanic membrane, external ear and ear canal normal bilaterally, oropharynx clear and moist with normal mucous membranes  Neck: neck supple and non tender without mass, no thyromegaly or thyroid nodules, no cervical lymphadenopathy   Pulmonary/Chest: clear to auscultation bilaterally- no wheezes, rales or rhonchi, normal air movement, no respiratory distress  Cardiovascular: normal rate, normal S1 and S2, no gallops, intact distal pulses, and no carotid bruits  Abdomen: soft, non-tender, non-distended, normal bowel sounds, no masses or organomegaly  Extremities: no cyanosis and no clubbing  Musculoskeletal: normal range of motion, no joint swelling, deformity or tenderness  Neurologic: gait and coordination normal and speech normal          No Known Allergies  Prior to Visit Medications    Not on File       CareTeam (Including outside providers/suppliers regularly involved in providing care):   Patient Care Team:  Deangelo Yadav MD as PCP - General  Deangelo Yadav MD as PCP - Empaneled Provider      Reviewed and updated this visit:  Tobacco  Allergies  Meds  Med Hx  Surg Hx  Soc Hx  Fam Hx

## 2025-07-24 ENCOUNTER — HOSPITAL ENCOUNTER (OUTPATIENT)
Facility: HOSPITAL | Age: 70
Setting detail: OUTPATIENT SURGERY
Discharge: HOME | End: 2025-07-24
Attending: INTERNAL MEDICINE | Admitting: INTERNAL MEDICINE
Payer: MEDICARE

## 2025-07-24 ENCOUNTER — ANESTHESIA (OUTPATIENT)
Facility: HOSPITAL | Age: 70
End: 2025-07-24
Payer: MEDICARE

## 2025-07-24 ENCOUNTER — ANESTHESIA EVENT (OUTPATIENT)
Facility: HOSPITAL | Age: 70
End: 2025-07-24
Payer: MEDICARE

## 2025-07-24 VITALS
RESPIRATION RATE: 14 BRPM | DIASTOLIC BLOOD PRESSURE: 79 MMHG | TEMPERATURE: 97 F | OXYGEN SATURATION: 96 % | WEIGHT: 251.2 LBS | BODY MASS INDEX: 33.29 KG/M2 | SYSTOLIC BLOOD PRESSURE: 122 MMHG | HEIGHT: 73 IN | HEART RATE: 74 BPM

## 2025-07-24 PROCEDURE — 6360000002 HC RX W HCPCS: Performed by: NURSE ANESTHETIST, CERTIFIED REGISTERED

## 2025-07-24 PROCEDURE — 3700000000 HC ANESTHESIA ATTENDED CARE: Performed by: INTERNAL MEDICINE

## 2025-07-24 PROCEDURE — 3700000001 HC ADD 15 MINUTES (ANESTHESIA): Performed by: INTERNAL MEDICINE

## 2025-07-24 PROCEDURE — 7100000010 HC PHASE II RECOVERY - FIRST 15 MIN: Performed by: INTERNAL MEDICINE

## 2025-07-24 PROCEDURE — 88305 TISSUE EXAM BY PATHOLOGIST: CPT

## 2025-07-24 PROCEDURE — 2709999900 HC NON-CHARGEABLE SUPPLY: Performed by: INTERNAL MEDICINE

## 2025-07-24 PROCEDURE — 3600007512: Performed by: INTERNAL MEDICINE

## 2025-07-24 PROCEDURE — 2580000003 HC RX 258: Performed by: INTERNAL MEDICINE

## 2025-07-24 PROCEDURE — 3600007502: Performed by: INTERNAL MEDICINE

## 2025-07-24 RX ORDER — SODIUM CHLORIDE 9 MG/ML
INJECTION, SOLUTION INTRAVENOUS CONTINUOUS
Status: DISCONTINUED | OUTPATIENT
Start: 2025-07-24 | End: 2025-07-24 | Stop reason: HOSPADM

## 2025-07-24 RX ORDER — SODIUM CHLORIDE 0.9 % (FLUSH) 0.9 %
5-40 SYRINGE (ML) INJECTION EVERY 12 HOURS SCHEDULED
Status: DISCONTINUED | OUTPATIENT
Start: 2025-07-24 | End: 2025-07-24 | Stop reason: HOSPADM

## 2025-07-24 RX ORDER — SODIUM CHLORIDE 9 MG/ML
INJECTION, SOLUTION INTRAVENOUS PRN
Status: DISCONTINUED | OUTPATIENT
Start: 2025-07-24 | End: 2025-07-24 | Stop reason: HOSPADM

## 2025-07-24 RX ORDER — SODIUM CHLORIDE 0.9 % (FLUSH) 0.9 %
5-40 SYRINGE (ML) INJECTION PRN
Status: DISCONTINUED | OUTPATIENT
Start: 2025-07-24 | End: 2025-07-24 | Stop reason: HOSPADM

## 2025-07-24 RX ADMIN — PROPOFOL 50 MG: 10 INJECTION, EMULSION INTRAVENOUS at 08:48

## 2025-07-24 RX ADMIN — PROPOFOL 50 MG: 10 INJECTION, EMULSION INTRAVENOUS at 08:57

## 2025-07-24 RX ADMIN — SODIUM CHLORIDE: 9 INJECTION, SOLUTION INTRAVENOUS at 08:42

## 2025-07-24 RX ADMIN — PROPOFOL 50 MG: 10 INJECTION, EMULSION INTRAVENOUS at 08:52

## 2025-07-24 RX ADMIN — PROPOFOL 50 MG: 10 INJECTION, EMULSION INTRAVENOUS at 09:04

## 2025-07-24 RX ADMIN — PROPOFOL 50 MG: 10 INJECTION, EMULSION INTRAVENOUS at 08:45

## 2025-07-24 ASSESSMENT — PAIN - FUNCTIONAL ASSESSMENT: PAIN_FUNCTIONAL_ASSESSMENT: NONE - DENIES PAIN

## 2025-07-24 NOTE — ANESTHESIA POSTPROCEDURE EVALUATION
Department of Anesthesiology  Postprocedure Note    Patient: Moises Chang Jr.  MRN: [id number]  YOB: 1955  Date of evaluation: 7/24/2025    Procedure Summary       Date: 07/24/25 Room / Location: [place] [address] / [facility] ENDOSCOPY    Anesthesia Start: 0842 Anesthesia Stop: 0916    Procedure: COLONOSCOPY (Lower GI Region) Diagnosis:       Hx of colonic polyps      (Hx of colonic polyps [Z86.0100])    Surgeons: Agustin Seo MD Responsible Provider: Etelvina Fulton MD    Anesthesia Type: MAC ASA Status: 2            Anesthesia Type: MAC    Saadia Phase I: Saadia Score: 10    Saadia Phase II: Saadia Score: 10    Anesthesia Post Evaluation    Level of consciousness: awake  Airway patency: patent  Nausea & Vomiting: no nausea  Cardiovascular status: hemodynamically stable  Respiratory status: acceptable  Hydration status: euvolemic  Pain management: adequate    No notable events documented.

## 2025-07-24 NOTE — PROGRESS NOTES
Initial RN admission and assessment performed and documented in Endoscopy navigator.     Patient evaluated by anesthesia in pre-procedure holding.     All procedural vital signs, airway assessment, and level of consciousness information monitored and recorded by anesthesia staff on the anesthesia record.     Report received from CRNA post procedure.  Patient transported to recovery area by RN.    Endoscopy post procedure time out was performed and specimens were verified with physician.    Endoscope was pre-cleaned at bedside immediately following procedure by savage tech..

## 2025-07-24 NOTE — OP NOTE
KANWAL Seo M.D.  [phone]               Colonoscopy Procedure Note    NAME: Moises Chang Jr.  :  1955  MRN:  [id number]    Indications:   Personal history of colon polyps (screening only)     : Agustin Seo MD    Referring Provider:  Deangelo Yadav MD    Staff: Circulator: Ami Valle RN  Endoscopy Technician: Katty Cancino    Prosthetic devices, grafts, tissues, transplant, or devices implanted: none    Medicines:  MAC anesthesia      Procedure Details:  After informed consent was obtained with all risks and benefits of the procedure explained and preprocedure exam completed, the patient was placed in the left lateral decubitus position.  Universal protocol for patient identification was performed and documented in the nursing notes.  Throughout the procedure, the patient's blood pressure was monitored at least every five minutes; pulse, and oxygen saturations were monitored continuously.  All vital signs were documented in the nursing notes.  A digital rectal exam was performed and was normal.  The Olympus videocolonoscope  was inserted in the rectum and carefully advanced to the cecum, which was identified by the ileocecal valve and appendiceal orifice. The colonoscope was slowly withdrawn with careful evaluation between folds. Retroflexion in the rectum and second examination of the right colon was performed; findings and interventions are described below. Procedure start time, extent reached time/cecum time, and procedure end time are documented in the nursing notes.  The quality of preparation was adequate.       Findings:   1.  2 mm sessile polyp in the ascending colon status post cold forceps polypectomy  2.  2 sessile polyps with greatest diameter of 5 mm (1 in the ascending, 1 in the descending) status post cold snare polypectomy  3.  Moderate left and right-sided diverticulosis  4.  Small internal hemorrhoids  5.  Rest of the colon is

## 2025-07-24 NOTE — ANESTHESIA PRE PROCEDURE
Department of Anesthesiology  Preprocedure Note       Name:  Moises Chang Jr.   Age:  69 y.o.  :  1955                                          MRN:  [id number]         Date:  2025      Surgeon: Surgeon(s):  Agustin Seo MD    Procedure: Procedure(s):  COLONOSCOPY    Medications prior to admission:   Prior to Admission medications    Not on File       Current medications:    No current facility-administered medications for this encounter.       Allergies:  No Known Allergies    Problem List:    Patient Active Problem List   Diagnosis Code    Mixed hyperlipidemia E78.2       Past Medical History:        Diagnosis Date    Obesity     sea saw yusuf with weight    Osteoarthritis     osteo in knees / left more severe       Past Surgical History:        Procedure Laterality Date    COLONOSCOPY N/A 2020    COLONOSCOPY performed by Agustin Seo MD at [facility] ENDOSCOPY    COLONOSCOPY  2017    Dr. eSo    GI      2017 colonoscopy    TONSILLECTOMY      as a kid       Social History:    Social History     Tobacco Use    Smoking status: Never    Smokeless tobacco: Never    Tobacco comments:     Quit smoking: cigar   Substance Use Topics    Alcohol use: Yes     Alcohol/week: 1.0 standard drink of alcohol     Types: 1 Shots of liquor per week                                Counseling given: Not Answered  Tobacco comments: Quit smoking: cigar      Vital Signs (Current): There were no vitals filed for this visit.                                           BP Readings from Last 3 Encounters:   24 137/87   21 139/86       NPO Status:                                                                                 BMI:   Wt Readings from Last 3 Encounters:   24 115.5 kg (254 lb 9.6 oz)   21 121.6 kg (268 lb)     There is no height or weight on file to calculate BMI.    CBC:   Lab Results   Component Value Date/Time    WBC 7.6 2024 02:24 PM    RBC 4.83 2024

## 2025-07-24 NOTE — DISCHARGE INSTRUCTIONS
[facility]  Agustin Seo M.D.  [address], [place] [address] [phone]            COLONOSCOPY DISCHARGE INSTRUCTIONS    Moises MICHAELS Charlene Chung  [id number]  1955    DISCOMFORT:  Redness at IV site- apply warm compress to area; if redness or soreness persist- contact your physician  There may be a slight amount of blood passed from the rectum  Gaseous discomfort- walking, belching will help relieve any discomfort  You may not operate a vehicle for 12 hours  You may not engage in an occupation involving machinery or appliances for the  rest of today  You may not drink alcoholic beverages for at least 12 hours  Avoid making any critical decisions for at least 24 hours    DIET:   You may resume your normal diet, but some patients find that heavy or large  meals may lead to indigestion or vomiting. I suggest a light meal as first food  intake. I recommend a whole food, plant-based diet for your overall health.    ACTIVITY:  You may resume your normal daily activities.  It is recommended that you spend the remainder of the day resting - avoid any strenuous activity.    CALL M.D. IF ANY SIGN OF:   Increasing pain, nausea, vomiting  Abdominal distension (swelling)  Significant bleeding (oral or rectal)  Fever   Pain in chest area  Shortness of breath    Additional Instructions:   Call Dr. Seo if any questions or problems at [phone]   You should receive the biopsy results by phone or mail within 3 weeks, if not, call  my office for the results      Should have a repeat colonoscopy in 3-5 years based on pathology. Colonoscopy showed 3 polyps removed, diverticulosis, and small internal hemorrhoids.

## 2025-07-24 NOTE — H&P
[address] [address]          Pre-procedure History and Physical       NAME:  Moises Chang Jr.   :   1955   MRN:   [id number]     CHIEF COMPLAINT/HPI: Personal history of colon polyps    PMH:  Past Medical History:   Diagnosis Date    Obesity     sea saw yusuf with weight    Osteoarthritis     osteo in knees / left more severe       PSH:  Past Surgical History:   Procedure Laterality Date    COLONOSCOPY N/A 2020    COLONOSCOPY performed by Agustin Seo MD at [facility] ENDOSCOPY    COLONOSCOPY  2017    Dr. Seo    GI      2017 colonoscopy    TONSILLECTOMY      as a kid       Allergies:  No Known Allergies    Home Medications:  None       Hospital Medications:  Current Facility-Administered Medications   Medication Dose Route Frequency    0.9 % sodium chloride infusion   IntraVENous Continuous    sodium chloride flush 0.9 % injection 5-40 mL  5-40 mL IntraVENous 2 times per day    sodium chloride flush 0.9 % injection 5-40 mL  5-40 mL IntraVENous PRN    0.9 % sodium chloride infusion   IntraVENous PRN       Family History:  Family History   Problem Relation Age of Onset    Thyroid Disease Mother     Diabetes Mother          22    Elevated Lipids Mother     Obesity Mother     Prostate Cancer Father 72         2012    Heart Disease Brother         dies     Thyroid Disease Daughter        Social History:  Social History     Tobacco Use    Smoking status: Never    Smokeless tobacco: Never    Tobacco comments:     Quit smoking: cigar   Substance Use Topics    Alcohol use: Yes     Alcohol/week: 1.0 standard drink of alcohol     Types: 1 Shots of liquor per week         PHYSICAL EXAM PRIOR TO SEDATION:  General: Alert, in no acute distress    Lungs:            CTA bilaterally  Heart:  Normal S1, S2    Abdomen: Soft, Non distended, Non tender.  Normoactive bowel sounds.      Assessment:   Stable for sedation administration.  Date of last

## 2025-08-12 SDOH — HEALTH STABILITY: PHYSICAL HEALTH: ON AVERAGE, HOW MANY MINUTES DO YOU ENGAGE IN EXERCISE AT THIS LEVEL?: 30 MIN

## 2025-08-12 SDOH — HEALTH STABILITY: PHYSICAL HEALTH: ON AVERAGE, HOW MANY DAYS PER WEEK DO YOU ENGAGE IN MODERATE TO STRENUOUS EXERCISE (LIKE A BRISK WALK)?: 7 DAYS

## 2025-08-12 ASSESSMENT — PATIENT HEALTH QUESTIONNAIRE - PHQ9
2. FEELING DOWN, DEPRESSED OR HOPELESS: NOT AT ALL
SUM OF ALL RESPONSES TO PHQ QUESTIONS 1-9: 0
1. LITTLE INTEREST OR PLEASURE IN DOING THINGS: NOT AT ALL
SUM OF ALL RESPONSES TO PHQ QUESTIONS 1-9: 0

## 2025-08-12 ASSESSMENT — LIFESTYLE VARIABLES
HOW MANY STANDARD DRINKS CONTAINING ALCOHOL DO YOU HAVE ON A TYPICAL DAY: 1
HOW OFTEN DO YOU HAVE A DRINK CONTAINING ALCOHOL: 2-4 TIMES A MONTH
HOW MANY STANDARD DRINKS CONTAINING ALCOHOL DO YOU HAVE ON A TYPICAL DAY: 1 OR 2
HOW OFTEN DO YOU HAVE A DRINK CONTAINING ALCOHOL: 3
HOW OFTEN DO YOU HAVE SIX OR MORE DRINKS ON ONE OCCASION: 1

## 2025-08-14 SDOH — ECONOMIC STABILITY: INCOME INSECURITY: IN THE LAST 12 MONTHS, WAS THERE A TIME WHEN YOU WERE NOT ABLE TO PAY THE MORTGAGE OR RENT ON TIME?: NO

## 2025-08-14 SDOH — ECONOMIC STABILITY: FOOD INSECURITY: WITHIN THE PAST 12 MONTHS, THE FOOD YOU BOUGHT JUST DIDN'T LAST AND YOU DIDN'T HAVE MONEY TO GET MORE.: NEVER TRUE

## 2025-08-14 SDOH — ECONOMIC STABILITY: TRANSPORTATION INSECURITY
IN THE PAST 12 MONTHS, HAS THE LACK OF TRANSPORTATION KEPT YOU FROM MEDICAL APPOINTMENTS OR FROM GETTING MEDICATIONS?: NO

## 2025-08-14 SDOH — ECONOMIC STABILITY: FOOD INSECURITY: WITHIN THE PAST 12 MONTHS, YOU WORRIED THAT YOUR FOOD WOULD RUN OUT BEFORE YOU GOT MONEY TO BUY MORE.: NEVER TRUE

## 2025-08-21 ENCOUNTER — OFFICE VISIT (OUTPATIENT)
Age: 70
End: 2025-08-21
Payer: MEDICARE

## 2025-08-21 VITALS
TEMPERATURE: 98.1 F | SYSTOLIC BLOOD PRESSURE: 130 MMHG | WEIGHT: 259.4 LBS | OXYGEN SATURATION: 97 % | HEART RATE: 78 BPM | BODY MASS INDEX: 34.38 KG/M2 | DIASTOLIC BLOOD PRESSURE: 85 MMHG | RESPIRATION RATE: 15 BRPM | HEIGHT: 73 IN

## 2025-08-21 DIAGNOSIS — Z12.5 PROSTATE CANCER SCREENING: ICD-10-CM

## 2025-08-21 DIAGNOSIS — M17.10 ARTHRITIS OF KNEE: ICD-10-CM

## 2025-08-21 DIAGNOSIS — K57.90 DIVERTICULOSIS: ICD-10-CM

## 2025-08-21 DIAGNOSIS — E78.2 MIXED HYPERLIPIDEMIA: ICD-10-CM

## 2025-08-21 DIAGNOSIS — Z00.00 MEDICARE ANNUAL WELLNESS VISIT, SUBSEQUENT: Primary | ICD-10-CM

## 2025-08-21 LAB
ALBUMIN SERPL-MCNC: 3.6 G/DL (ref 3.5–5.2)
ALBUMIN/GLOB SERPL: 1.3 (ref 1.1–2.2)
ALP SERPL-CCNC: 62 U/L (ref 40–129)
ALT SERPL-CCNC: 19 U/L (ref 10–50)
ANION GAP SERPL CALC-SCNC: 8 MMOL/L (ref 2–14)
AST SERPL-CCNC: 18 U/L (ref 10–50)
BASOPHILS # BLD: 0.05 K/UL (ref 0–0.1)
BASOPHILS NFR BLD: 0.8 % (ref 0–1)
BILIRUB SERPL-MCNC: 0.6 MG/DL (ref 0–1.2)
BUN SERPL-MCNC: 19 MG/DL (ref 8–23)
BUN/CREAT SERPL: 24 (ref 12–20)
CALCIUM SERPL-MCNC: 8.5 MG/DL (ref 8.8–10.2)
CHLORIDE SERPL-SCNC: 104 MMOL/L (ref 98–107)
CHOLEST SERPL-MCNC: 196 MG/DL (ref 0–200)
CO2 SERPL-SCNC: 27 MMOL/L (ref 20–29)
CREAT SERPL-MCNC: 0.79 MG/DL (ref 0.7–1.2)
DIFFERENTIAL METHOD BLD: NORMAL
EOSINOPHIL # BLD: 0.1 K/UL (ref 0–0.4)
EOSINOPHIL NFR BLD: 1.5 % (ref 0–7)
ERYTHROCYTE [DISTWIDTH] IN BLOOD BY AUTOMATED COUNT: 13 % (ref 11.5–14.5)
GLOBULIN SER CALC-MCNC: 2.8 G/DL (ref 2–4)
GLUCOSE SERPL-MCNC: 96 MG/DL (ref 65–100)
HCT VFR BLD AUTO: 43.9 % (ref 36.6–50.3)
HDLC SERPL-MCNC: 30 MG/DL (ref 40–60)
HDLC SERPL: 6.6 (ref 0–5)
HGB BLD-MCNC: 14.4 G/DL (ref 12.1–17)
IMM GRANULOCYTES # BLD AUTO: 0.03 K/UL (ref 0–0.04)
IMM GRANULOCYTES NFR BLD AUTO: 0.5 % (ref 0–0.5)
LDLC SERPL CALC-MCNC: 134 MG/DL (ref 0–100)
LYMPHOCYTES # BLD: 1.9 K/UL (ref 0.8–3.5)
LYMPHOCYTES NFR BLD: 29.2 % (ref 12–49)
MCH RBC QN AUTO: 31 PG (ref 26–34)
MCHC RBC AUTO-ENTMCNC: 32.8 G/DL (ref 30–36.5)
MCV RBC AUTO: 94.4 FL (ref 80–99)
MONOCYTES # BLD: 0.63 K/UL (ref 0–1)
MONOCYTES NFR BLD: 9.7 % (ref 5–13)
NEUTS SEG # BLD: 3.79 K/UL (ref 1.8–8)
NEUTS SEG NFR BLD: 58.3 % (ref 32–75)
NRBC # BLD: 0 K/UL (ref 0–0.01)
NRBC BLD-RTO: 0 PER 100 WBC
PLATELET # BLD AUTO: 190 K/UL (ref 150–400)
PMV BLD AUTO: 10.7 FL (ref 8.9–12.9)
POTASSIUM SERPL-SCNC: 4.3 MMOL/L (ref 3.5–5.1)
PROT SERPL-MCNC: 6.4 G/DL (ref 6.4–8.3)
PSA SERPL-MCNC: 0.4 NG/ML (ref 0–4.4)
RBC # BLD AUTO: 4.65 M/UL (ref 4.1–5.7)
SODIUM SERPL-SCNC: 139 MMOL/L (ref 136–145)
TRIGL SERPL-MCNC: 161 MG/DL (ref 0–150)
TSH, 3RD GENERATION: 1.22 UIU/ML (ref 0.27–4.2)
VLDLC SERPL CALC-MCNC: 32 MG/DL
WBC # BLD AUTO: 6.5 K/UL (ref 4.1–11.1)

## 2025-08-21 PROCEDURE — 99213 OFFICE O/P EST LOW 20 MIN: CPT | Performed by: INTERNAL MEDICINE

## 2025-08-21 RX ORDER — PSYLLIUM SEED
1 PACKET (EA) ORAL DAILY
COMMUNITY
Start: 2025-08-21

## 2025-08-21 ASSESSMENT — LIFESTYLE VARIABLES
HOW OFTEN DO YOU HAVE A DRINK CONTAINING ALCOHOL: 2-4 TIMES A MONTH
HOW MANY STANDARD DRINKS CONTAINING ALCOHOL DO YOU HAVE ON A TYPICAL DAY: 1 OR 2

## (undated) DEVICE — SNARE ENDOSCP L240CM LOOP W27MM SHTH DIA2.4MM WRK CHN 2.8MM

## (undated) DEVICE — FORCEPS BX L240CM JAW DIA2.4MM ORNG L CAP W/ NDL DISP RAD

## (undated) DEVICE — TRAP SURG QUAD PARABOLA SLOT DSGN SFTY SCRN TRAPEASE

## (undated) DEVICE — SNARE ENDOSCP DIA9MM SHTH DIA2.4MM CLD FOR POLYP EXACTO

## (undated) DEVICE — SUPPLEMENT DIGESTIVE H2O SOL GI-EASE